# Patient Record
Sex: MALE | Race: WHITE | NOT HISPANIC OR LATINO | Employment: OTHER | URBAN - NONMETROPOLITAN AREA
[De-identification: names, ages, dates, MRNs, and addresses within clinical notes are randomized per-mention and may not be internally consistent; named-entity substitution may affect disease eponyms.]

---

## 2022-07-25 ENCOUNTER — TELEPHONE (OUTPATIENT)
Dept: FAMILY MEDICINE CLINIC | Facility: CLINIC | Age: 57
End: 2022-07-25

## 2022-07-25 NOTE — TELEPHONE ENCOUNTER
Patient states that [facility] sent over 2 requests for the patient for supplies for his CPAP machine. It needs the doctor's authorization. Patient is calling for an update. Ph: [phone]

## 2022-07-29 ENCOUNTER — TELEPHONE (OUTPATIENT)
Dept: FAMILY MEDICINE CLINIC | Facility: CLINIC | Age: 57
End: 2022-07-29

## 2022-07-29 DIAGNOSIS — G47.33 OBSTRUCTIVE SLEEP APNEA SYNDROME: Primary | ICD-10-CM

## 2022-08-01 ENCOUNTER — TELEPHONE (OUTPATIENT)
Dept: FAMILY MEDICINE CLINIC | Facility: CLINIC | Age: 57
End: 2022-08-01

## 2022-08-01 NOTE — TELEPHONE ENCOUNTER
PATIENT CALLED AND SAID HE NEEDS HIS SUPPLIES FOR HIS CPAP MACHINE SENT OVER TO [facility]. PATIENT CAN BE REACHED AT  [phone]

## 2022-08-17 RX ORDER — AMLODIPINE AND VALSARTAN 5; 320 MG/1; MG/1
TABLET ORAL
Qty: 90 TABLET | Refills: 0 | Status: SHIPPED | OUTPATIENT
Start: 2022-08-17 | End: 2022-11-30 | Stop reason: SDUPTHER

## 2022-08-17 RX ORDER — METOPROLOL SUCCINATE 50 MG/1
TABLET, EXTENDED RELEASE ORAL
Qty: 90 TABLET | Refills: 0 | Status: SHIPPED | OUTPATIENT
Start: 2022-08-17 | End: 2022-11-30 | Stop reason: SDUPTHER

## 2022-08-17 RX ORDER — EZETIMIBE 10 MG/1
TABLET ORAL
Qty: 90 TABLET | Refills: 0 | Status: SHIPPED | OUTPATIENT
Start: 2022-08-17 | End: 2022-11-30 | Stop reason: SDUPTHER

## 2022-08-17 RX ORDER — ROSUVASTATIN CALCIUM 10 MG/1
TABLET, COATED ORAL
Qty: 90 TABLET | Refills: 0 | Status: SHIPPED | OUTPATIENT
Start: 2022-08-17 | End: 2022-11-30 | Stop reason: SDUPTHER

## 2022-11-11 RX ORDER — METOPROLOL SUCCINATE 50 MG/1
TABLET, EXTENDED RELEASE ORAL
Qty: 90 TABLET | Refills: 0 | OUTPATIENT
Start: 2022-11-11

## 2022-11-11 RX ORDER — ROSUVASTATIN CALCIUM 10 MG/1
TABLET, COATED ORAL
Qty: 90 TABLET | Refills: 0 | OUTPATIENT
Start: 2022-11-11

## 2022-11-11 RX ORDER — EZETIMIBE 10 MG/1
TABLET ORAL
Qty: 90 TABLET | Refills: 0 | OUTPATIENT
Start: 2022-11-11

## 2022-11-11 RX ORDER — AMLODIPINE AND VALSARTAN 5; 320 MG/1; MG/1
TABLET ORAL
Qty: 90 TABLET | Refills: 0 | OUTPATIENT
Start: 2022-11-11

## 2022-11-12 RX ORDER — AMLODIPINE AND VALSARTAN 5; 320 MG/1; MG/1
TABLET ORAL
Qty: 90 TABLET | Refills: 0 | OUTPATIENT
Start: 2022-11-12

## 2022-11-15 RX ORDER — EZETIMIBE 10 MG/1
TABLET ORAL
Qty: 90 TABLET | Refills: 0 | OUTPATIENT
Start: 2022-11-15

## 2022-11-15 RX ORDER — AMLODIPINE AND VALSARTAN 5; 320 MG/1; MG/1
TABLET ORAL
Qty: 90 TABLET | Refills: 0 | OUTPATIENT
Start: 2022-11-15

## 2022-11-15 RX ORDER — METOPROLOL SUCCINATE 50 MG/1
TABLET, EXTENDED RELEASE ORAL
Qty: 90 TABLET | Refills: 0 | OUTPATIENT
Start: 2022-11-15

## 2022-11-15 RX ORDER — ROSUVASTATIN CALCIUM 10 MG/1
TABLET, COATED ORAL
Qty: 90 TABLET | Refills: 0 | OUTPATIENT
Start: 2022-11-15

## 2022-11-28 RX ORDER — METOPROLOL SUCCINATE 50 MG/1
TABLET, EXTENDED RELEASE ORAL
Qty: 90 TABLET | Refills: 0 | OUTPATIENT
Start: 2022-11-28

## 2022-11-28 RX ORDER — ROSUVASTATIN CALCIUM 10 MG/1
TABLET, COATED ORAL
Qty: 90 TABLET | Refills: 0 | OUTPATIENT
Start: 2022-11-28

## 2022-11-28 RX ORDER — AMLODIPINE AND VALSARTAN 5; 320 MG/1; MG/1
TABLET ORAL
Qty: 90 TABLET | Refills: 0 | OUTPATIENT
Start: 2022-11-28

## 2022-11-29 ENCOUNTER — TELEPHONE (OUTPATIENT)
Dept: FAMILY MEDICINE CLINIC | Facility: CLINIC | Age: 57
End: 2022-11-29

## 2022-11-29 NOTE — TELEPHONE ENCOUNTER
PT FOUND IN NEXT GEN WAS LAST SEEN 9 MONTHS AGO PT HAS BEEN SCHEDULED FOR AN APPT 12/16 FOR TELEPHONE VISIT THAT'S THE SOONEST APPT AVAILABLE HE RUNS OUT OF MEDICATION NEXT WEEK METOPROTOL 50 MG, AMLODIPINE 5-320 MG, ROSUVASTATIN CALCIUM 10 MG, EZETIMIBE 10 MG. PT IS REQUESTING REFILL TO GET THROUGH UNTIL HIS VISIT.  PLEASE ADVISE AND GIVE CALL

## 2022-11-30 RX ORDER — METOPROLOL SUCCINATE 50 MG/1
50 TABLET, EXTENDED RELEASE ORAL DAILY
Qty: 90 TABLET | Refills: 0 | Status: SHIPPED | OUTPATIENT
Start: 2022-11-30 | End: 2022-12-01 | Stop reason: SDUPTHER

## 2022-11-30 RX ORDER — EZETIMIBE 10 MG/1
10 TABLET ORAL DAILY
Qty: 90 TABLET | Refills: 0 | Status: SHIPPED | OUTPATIENT
Start: 2022-11-30 | End: 2022-12-01 | Stop reason: SDUPTHER

## 2022-11-30 RX ORDER — ROSUVASTATIN CALCIUM 10 MG/1
10 TABLET, COATED ORAL DAILY
Qty: 90 TABLET | Refills: 0 | Status: SHIPPED | OUTPATIENT
Start: 2022-11-30 | End: 2022-12-01 | Stop reason: SDUPTHER

## 2022-11-30 RX ORDER — AMLODIPINE AND VALSARTAN 5; 320 MG/1; MG/1
1 TABLET ORAL DAILY
Qty: 90 TABLET | Refills: 0 | Status: SHIPPED | OUTPATIENT
Start: 2022-11-30 | End: 2022-12-01 | Stop reason: SDUPTHER

## 2022-12-01 RX ORDER — METOPROLOL SUCCINATE 50 MG/1
50 TABLET, EXTENDED RELEASE ORAL DAILY
Qty: 90 TABLET | Refills: 0 | Status: SHIPPED | OUTPATIENT
Start: 2022-12-01 | End: 2022-12-16 | Stop reason: SDUPTHER

## 2022-12-01 RX ORDER — AMLODIPINE AND VALSARTAN 5; 320 MG/1; MG/1
1 TABLET ORAL DAILY
Qty: 90 TABLET | Refills: 0 | Status: SHIPPED | OUTPATIENT
Start: 2022-12-01 | End: 2022-12-16 | Stop reason: SDUPTHER

## 2022-12-01 RX ORDER — EZETIMIBE 10 MG/1
10 TABLET ORAL DAILY
Qty: 90 TABLET | Refills: 0 | Status: SHIPPED | OUTPATIENT
Start: 2022-12-01 | End: 2022-12-16 | Stop reason: SDUPTHER

## 2022-12-01 RX ORDER — ROSUVASTATIN CALCIUM 10 MG/1
10 TABLET, COATED ORAL DAILY
Qty: 90 TABLET | Refills: 0 | Status: SHIPPED | OUTPATIENT
Start: 2022-12-01 | End: 2022-12-16 | Stop reason: SDUPTHER

## 2022-12-16 ENCOUNTER — TELEMEDICINE (OUTPATIENT)
Dept: FAMILY MEDICINE CLINIC | Facility: CLINIC | Age: 57
End: 2022-12-16

## 2022-12-16 DIAGNOSIS — Z12.5 PROSTATE CANCER SCREENING: ICD-10-CM

## 2022-12-16 DIAGNOSIS — G47.33 OBSTRUCTIVE SLEEP APNEA SYNDROME: ICD-10-CM

## 2022-12-16 DIAGNOSIS — I10 PRIMARY HYPERTENSION: Primary | ICD-10-CM

## 2022-12-16 DIAGNOSIS — Z12.11 COLON CANCER SCREENING: ICD-10-CM

## 2022-12-16 DIAGNOSIS — E78.2 MIXED HYPERLIPIDEMIA: ICD-10-CM

## 2022-12-16 PROCEDURE — 99213 OFFICE O/P EST LOW 20 MIN: CPT | Performed by: FAMILY MEDICINE

## 2022-12-16 RX ORDER — METOPROLOL SUCCINATE 50 MG/1
50 TABLET, EXTENDED RELEASE ORAL DAILY
Qty: 90 TABLET | Refills: 0 | Status: SHIPPED | OUTPATIENT
Start: 2022-12-16

## 2022-12-16 RX ORDER — AMLODIPINE AND VALSARTAN 5; 320 MG/1; MG/1
1 TABLET ORAL DAILY
Qty: 90 TABLET | Refills: 0 | Status: SHIPPED | OUTPATIENT
Start: 2022-12-16

## 2022-12-16 RX ORDER — EZETIMIBE 10 MG/1
10 TABLET ORAL DAILY
Qty: 90 TABLET | Refills: 0 | Status: SHIPPED | OUTPATIENT
Start: 2022-12-16

## 2022-12-16 RX ORDER — ROSUVASTATIN CALCIUM 10 MG/1
10 TABLET, COATED ORAL DAILY
Qty: 90 TABLET | Refills: 0 | Status: SHIPPED | OUTPATIENT
Start: 2022-12-16 | End: 2023-03-17

## 2022-12-16 NOTE — PROGRESS NOTES
Chief Complaint  Hypertension, Hyperlipidemia, and Sleep apnea    Subjective          Nelson Fong presents to [facility]  History of Present Illness  Patient comes in today basically get his medications refilled he like to discuss possibly getting Cologuard he is concerned about COVID still would like to do this he wants to get his blood work set up and also basically wants to discuss his sleep apnea.  Patient consents that he wants his visit done through [facility] he is at home today and I am in the clinic  Hypertension  Pertinent negatives include no shortness of breath.   Hyperlipidemia  Pertinent negatives include no shortness of breath.       Objective   Vital Signs:   There were no vitals taken for this visit.    There is no height or weight on file to calculate BMI.    Review of Systems   Constitutional: Negative.    HENT: Negative for congestion, dental problem, ear discharge, ear pain and sore throat.    Respiratory: Negative for apnea, chest tightness and shortness of breath.    Gastrointestinal: Negative for constipation and nausea.   Endocrine: Negative for polyuria.   Genitourinary: Negative for difficulty urinating.   Musculoskeletal: Negative for arthralgias and gait problem.   Skin: Negative for rash.   Hematological: Negative for adenopathy.       Past History:  Medical History: has a past medical history of Knee pain and Sleep apnea (2006).   Surgical History: has no past surgical history on file.         Current Outpatient Medications:   •  amLODIPine-valsartan (EXFORGE) 5-320 MG per tablet, Take 1 tablet by mouth Daily., Disp: 90 tablet, Rfl: 0  •  ezetimibe (ZETIA) 10 MG tablet, Take 1 tablet by mouth Daily., Disp: 90 tablet, Rfl: 0  •  metoprolol succinate XL (TOPROL-XL) 50 MG 24 hr tablet, Take 1 tablet by mouth Daily., Disp: 90 tablet, Rfl: 0  •  rosuvastatin (CRESTOR) 10 MG tablet, Take 1 tablet by mouth Daily., Disp: 90 tablet, Rfl: 0    Allergies: Patient has  no allergy information on record.    Physical Exam  Vitals reviewed.   Constitutional:       Appearance: Normal appearance.   HENT:      Head: Normocephalic.      Right Ear: External ear normal.      Left Ear: External ear normal.      Nose: Nose normal.   Pulmonary:      Effort: Pulmonary effort is normal.   Neurological:      Mental Status: He is alert and oriented to person, place, and time. Mental status is at baseline.   Psychiatric:         Mood and Affect: Mood normal.          Result Review :                   Assessment and Plan    Diagnoses and all orders for this visit:    1. Primary hypertension (Primary)  Comments:  Patient reports BP is good refill medications arrange blood work  Orders:  -     amLODIPine-valsartan (EXFORGE) 5-320 MG per tablet; Take 1 tablet by mouth Daily.  Dispense: 90 tablet; Refill: 0  -     metoprolol succinate XL (TOPROL-XL) 50 MG 24 hr tablet; Take 1 tablet by mouth Daily.  Dispense: 90 tablet; Refill: 0  -     CBC & Differential; Future    2. Mixed hyperlipidemia  Comments:  Refill medications get blood work  Orders:  -     ezetimibe (ZETIA) 10 MG tablet; Take 1 tablet by mouth Daily.  Dispense: 90 tablet; Refill: 0  -     rosuvastatin (CRESTOR) 10 MG tablet; Take 1 tablet by mouth Daily.  Dispense: 90 tablet; Refill: 0  -     Comprehensive Metabolic Panel; Future  -     Lipid Panel; Future    3. Colon cancer screening  Comments:  We will arrange Cologuard  Orders:  -     Cologuard - Stool, Per Rectum; Future    4. Obstructive sleep apnea syndrome  Comments:  Discussed supplies and retesting limited CPAP is available we will monitor    5. Prostate cancer screening  Comments:  We will get PSA  Orders:  -     PSA Screen; Future              Follow Up   No follow-ups on file.  Patient was given instructions and counseling regarding his condition or for health maintenance advice. Please see specific information pulled into the AVS if appropriate.     Dave Giordano MD

## 2022-12-22 ENCOUNTER — LAB (OUTPATIENT)
Dept: FAMILY MEDICINE CLINIC | Facility: CLINIC | Age: 57
End: 2022-12-22

## 2022-12-22 DIAGNOSIS — Z12.5 PROSTATE CANCER SCREENING: ICD-10-CM

## 2022-12-22 DIAGNOSIS — I10 PRIMARY HYPERTENSION: ICD-10-CM

## 2022-12-22 DIAGNOSIS — E78.2 MIXED HYPERLIPIDEMIA: ICD-10-CM

## 2022-12-22 PROCEDURE — 36415 COLL VENOUS BLD VENIPUNCTURE: CPT | Performed by: FAMILY MEDICINE

## 2022-12-23 LAB
ALBUMIN SERPL-MCNC: 4.6 G/DL (ref 3.8–4.9)
ALBUMIN/GLOB SERPL: 2 {RATIO} (ref 1.2–2.2)
ALP SERPL-CCNC: 98 IU/L (ref 44–121)
ALT SERPL-CCNC: 60 IU/L (ref 0–44)
AST SERPL-CCNC: 35 IU/L (ref 0–40)
BASOPHILS # BLD AUTO: 0 X10E3/UL (ref 0–0.2)
BASOPHILS NFR BLD AUTO: 1 %
BILIRUB SERPL-MCNC: 0.5 MG/DL (ref 0–1.2)
BUN SERPL-MCNC: 15 MG/DL (ref 6–24)
BUN/CREAT SERPL: 18 (ref 9–20)
CALCIUM SERPL-MCNC: 9.6 MG/DL (ref 8.7–10.2)
CHLORIDE SERPL-SCNC: 101 MMOL/L (ref 96–106)
CHOLEST SERPL-MCNC: 173 MG/DL (ref 100–199)
CO2 SERPL-SCNC: 24 MMOL/L (ref 20–29)
CREAT SERPL-MCNC: 0.84 MG/DL (ref 0.76–1.27)
EGFRCR SERPLBLD CKD-EPI 2021: 102 ML/MIN/1.73
EOSINOPHIL # BLD AUTO: 0.2 X10E3/UL (ref 0–0.4)
EOSINOPHIL NFR BLD AUTO: 3 %
ERYTHROCYTE [DISTWIDTH] IN BLOOD BY AUTOMATED COUNT: 13.3 % (ref 11.6–15.4)
GLOBULIN SER CALC-MCNC: 2.3 G/DL (ref 1.5–4.5)
GLUCOSE SERPL-MCNC: 121 MG/DL (ref 70–99)
HCT VFR BLD AUTO: 46.3 % (ref 37.5–51)
HDLC SERPL-MCNC: 50 MG/DL
HGB BLD-MCNC: 15.4 G/DL (ref 13–17.7)
IMM GRANULOCYTES # BLD AUTO: 0 X10E3/UL (ref 0–0.1)
IMM GRANULOCYTES NFR BLD AUTO: 0 %
LDLC SERPL CALC-MCNC: 76 MG/DL (ref 0–99)
LYMPHOCYTES # BLD AUTO: 2.7 X10E3/UL (ref 0.7–3.1)
LYMPHOCYTES NFR BLD AUTO: 33 %
MCH RBC QN AUTO: 32 PG (ref 26.6–33)
MCHC RBC AUTO-ENTMCNC: 33.3 G/DL (ref 31.5–35.7)
MCV RBC AUTO: 96 FL (ref 79–97)
MONOCYTES # BLD AUTO: 1 X10E3/UL (ref 0.1–0.9)
MONOCYTES NFR BLD AUTO: 12 %
NEUTROPHILS # BLD AUTO: 4.2 X10E3/UL (ref 1.4–7)
NEUTROPHILS NFR BLD AUTO: 51 %
PLATELET # BLD AUTO: 205 X10E3/UL (ref 150–450)
POTASSIUM SERPL-SCNC: 4.3 MMOL/L (ref 3.5–5.2)
PROT SERPL-MCNC: 6.9 G/DL (ref 6–8.5)
PSA SERPL-MCNC: 0.9 NG/ML (ref 0–4)
RBC # BLD AUTO: 4.82 X10E6/UL (ref 4.14–5.8)
SODIUM SERPL-SCNC: 140 MMOL/L (ref 134–144)
TRIGL SERPL-MCNC: 295 MG/DL (ref 0–149)
VLDLC SERPL CALC-MCNC: 47 MG/DL (ref 5–40)
WBC # BLD AUTO: 8.3 X10E3/UL (ref 3.4–10.8)

## 2022-12-28 ENCOUNTER — TELEPHONE (OUTPATIENT)
Dept: FAMILY MEDICINE CLINIC | Facility: CLINIC | Age: 57
End: 2022-12-28

## 2022-12-28 NOTE — PROGRESS NOTES
One of his liver test was up and his sugar was slightly up so just needs to watch the fats and sugar in his diet because his triglycerides were up to not repeat those in 6 months

## 2022-12-28 NOTE — TELEPHONE ENCOUNTER
Hub can read voice mail box not set up   Will send letter to call the office ----- Message from Dave Giordano MD sent at 12/28/2022  8:08 AM EST -----  One of his liver test was up and his sugar was slightly up so just needs to watch the fats and sugar in his diet because his triglycerides were up to not repeat those in 6 months

## 2023-01-11 ENCOUNTER — TELEPHONE (OUTPATIENT)
Dept: FAMILY MEDICINE CLINIC | Facility: CLINIC | Age: 58
End: 2023-01-11
Payer: COMMERCIAL

## 2023-01-11 DIAGNOSIS — Z12.11 COLON CANCER SCREENING: Primary | ICD-10-CM

## 2023-01-11 NOTE — TELEPHONE ENCOUNTER
----- Message from Dave Giordano MD sent at 1/11/2023  2:12 PM EST -----  Cologuard was positive he needs to be scheduled for colonoscopy      Patient contacted

## 2023-01-11 NOTE — TELEPHONE ENCOUNTER
----- Message from Dave Giordano MD sent at 1/11/2023  2:12 PM EST -----  Cologuard was positive  he needs to be scheduled for colonoscopy            Patient contacted           He needs to be scheduled for colonoscopy

## 2023-02-16 ENCOUNTER — TELEPHONE (OUTPATIENT)
Dept: FAMILY MEDICINE CLINIC | Facility: CLINIC | Age: 58
End: 2023-02-16

## 2023-02-16 NOTE — TELEPHONE ENCOUNTER
Pharmacy Name: [facility] [address] [place] [address] [facility] [phone] [facility] [phone]      Pharmacy representative name: STEPHANI    Pharmacy representative phone number: [phone]    What medication are you calling in regards to: ROSUVASTATIN 10 MG    What question does the pharmacy have: THE ABOVE MEDICATION IS ON BACK ORDER AND INSURANCE WILL NOT COVER 5 MG TWICE A DAY.  IS THERE ANOTHER STATIN THAT DR WISE WOULD LIKE TO SEND IN?    Who is the provider that prescribed the medication: BILLIE

## 2023-03-17 DIAGNOSIS — E78.2 MIXED HYPERLIPIDEMIA: ICD-10-CM

## 2023-03-17 RX ORDER — ROSUVASTATIN CALCIUM 10 MG/1
TABLET, COATED ORAL
Qty: 90 TABLET | Refills: 0 | Status: SHIPPED | OUTPATIENT
Start: 2023-03-17

## 2023-05-22 DIAGNOSIS — I10 PRIMARY HYPERTENSION: ICD-10-CM

## 2023-05-22 RX ORDER — METOPROLOL SUCCINATE 50 MG/1
TABLET, EXTENDED RELEASE ORAL
Qty: 90 TABLET | Refills: 0 | OUTPATIENT
Start: 2023-05-22

## 2023-05-22 RX ORDER — AMLODIPINE AND VALSARTAN 5; 320 MG/1; MG/1
TABLET ORAL
Qty: 90 TABLET | Refills: 0 | OUTPATIENT
Start: 2023-05-22

## 2023-05-22 NOTE — TELEPHONE ENCOUNTER
BILLIE PATIENT  HUB CAN READ & SCHEDULE  Called patient but was disconnected, called back and left message to call the office to schedule appt for med refills.

## 2023-06-06 ENCOUNTER — TELEMEDICINE (OUTPATIENT)
Dept: FAMILY MEDICINE CLINIC | Facility: CLINIC | Age: 58
End: 2023-06-06
Payer: COMMERCIAL

## 2023-06-06 VITALS — WEIGHT: 275 LBS | HEIGHT: 70 IN | BODY MASS INDEX: 39.37 KG/M2

## 2023-06-06 DIAGNOSIS — I10 PRIMARY HYPERTENSION: ICD-10-CM

## 2023-06-06 DIAGNOSIS — E78.2 MIXED HYPERLIPIDEMIA: ICD-10-CM

## 2023-06-06 RX ORDER — ROSUVASTATIN CALCIUM 10 MG/1
10 TABLET, COATED ORAL DAILY
Qty: 90 TABLET | Refills: 1 | Status: SHIPPED | OUTPATIENT
Start: 2023-06-06

## 2023-06-06 RX ORDER — METOPROLOL SUCCINATE 50 MG/1
50 TABLET, EXTENDED RELEASE ORAL DAILY
Qty: 90 TABLET | Refills: 1 | Status: SHIPPED | OUTPATIENT
Start: 2023-06-06

## 2023-06-06 RX ORDER — EZETIMIBE 10 MG/1
10 TABLET ORAL DAILY
Qty: 90 TABLET | Refills: 1 | Status: SHIPPED | OUTPATIENT
Start: 2023-06-06

## 2023-06-06 RX ORDER — AMLODIPINE AND VALSARTAN 5; 320 MG/1; MG/1
1 TABLET ORAL DAILY
Qty: 90 TABLET | Refills: 1 | Status: SHIPPED | OUTPATIENT
Start: 2023-06-06

## 2023-06-06 NOTE — PROGRESS NOTES
"Chief Complaint  Hypertension and Hyperlipidemia    Subjective          Nelson Fong presents to [facility]  History of Present Illness  He comes in today recheck on his medical problems he needs his blood pressure treatment medications refilled and his cholesterol he says he is doing well with no problems or difficulties he states that he has been feeling well and states that he just needs his meds redone.  Patient consents to have his visit done through [facility] today he is at home and I am in the clinic  Hypertension  Pertinent negatives include no shortness of breath.   Hyperlipidemia  Pertinent negatives include no shortness of breath.     Objective   Vital Signs:   Ht 177.8 cm (70\")   Wt 125 kg (275 lb)   BMI 39.46 kg/m²     Body mass index is 39.46 kg/m².    Review of Systems   Constitutional: Negative.    HENT:  Negative for congestion, dental problem, ear discharge, ear pain and sore throat.    Respiratory:  Negative for apnea, chest tightness and shortness of breath.    Gastrointestinal:  Negative for constipation and nausea.   Endocrine: Negative for polyuria.   Genitourinary:  Negative for difficulty urinating.   Musculoskeletal:  Negative for arthralgias and gait problem.   Skin:  Negative for rash.   Hematological:  Negative for adenopathy.     Past History:  Medical History: has a past medical history of Knee pain and Sleep apnea (2006).   Surgical History: has no past surgical history on file.         Current Outpatient Medications:     amLODIPine-valsartan (EXFORGE) 5-320 MG per tablet, Take 1 tablet by mouth Daily., Disp: 90 tablet, Rfl: 1    ezetimibe (ZETIA) 10 MG tablet, Take 1 tablet by mouth Daily., Disp: 90 tablet, Rfl: 1    metoprolol succinate XL (TOPROL-XL) 50 MG 24 hr tablet, Take 1 tablet by mouth Daily., Disp: 90 tablet, Rfl: 1    rosuvastatin (CRESTOR) 10 MG tablet, Take 1 tablet by mouth Daily., Disp: 90 tablet, Rfl: 1    Allergies: Patient has no known " allergies.    Physical Exam  Constitutional:       Appearance: Normal appearance.   HENT:      Head: Normocephalic.      Right Ear: External ear normal.      Left Ear: External ear normal.      Nose: Nose normal.      Mouth/Throat:      Pharynx: Oropharynx is clear.   Eyes:      Pupils: Pupils are equal, round, and reactive to light.   Neurological:      General: No focal deficit present.      Mental Status: He is alert and oriented to person, place, and time.        Result Review :                   Assessment and Plan    Diagnoses and all orders for this visit:    1. Mixed hyperlipidemia  Comments:  Refill medications get blood work  Orders:  -     rosuvastatin (CRESTOR) 10 MG tablet; Take 1 tablet by mouth Daily.  Dispense: 90 tablet; Refill: 1  -     ezetimibe (ZETIA) 10 MG tablet; Take 1 tablet by mouth Daily.  Dispense: 90 tablet; Refill: 1    2. Primary hypertension  Comments:  Patient reports BP is good refill medications arrange blood work  Orders:  -     metoprolol succinate XL (TOPROL-XL) 50 MG 24 hr tablet; Take 1 tablet by mouth Daily.  Dispense: 90 tablet; Refill: 1  -     amLODIPine-valsartan (EXFORGE) 5-320 MG per tablet; Take 1 tablet by mouth Daily.  Dispense: 90 tablet; Refill: 1              Follow Up   Return in about 6 months (around 12/6/2023).  Patient was given instructions and counseling regarding his condition or for health maintenance advice. Please see specific information pulled into the AVS if appropriate.     Dave Giordano MD

## 2023-12-06 DIAGNOSIS — I10 PRIMARY HYPERTENSION: ICD-10-CM

## 2023-12-06 DIAGNOSIS — E78.2 MIXED HYPERLIPIDEMIA: ICD-10-CM

## 2023-12-06 NOTE — TELEPHONE ENCOUNTER
Caller: Nelson Fong    Relationship: Self    Best call back number: [phone]     Requested Prescriptions:   Requested Prescriptions     Pending Prescriptions Disp Refills    amLODIPine-valsartan (EXFORGE) 5-320 MG per tablet 90 tablet 1     Sig: Take 1 tablet by mouth Daily.    ezetimibe (ZETIA) 10 MG tablet 90 tablet 1     Sig: Take 1 tablet by mouth Daily.    metoprolol succinate XL (TOPROL-XL) 50 MG 24 hr tablet 90 tablet 1     Sig: Take 1 tablet by mouth Daily.    rosuvastatin (CRESTOR) 10 MG tablet 90 tablet 1     Sig: Take 1 tablet by mouth Daily.        Pharmacy where request should be sent: [address] [phone] [facility] [phone] FX     Last office visit with prescribing clinician: Visit date not found   Last telemedicine visit with prescribing clinician: Visit date not found   Next office visit with prescribing clinician: Visit date not found     Does the patient have less than a 3 day supply:  [] Yes  [x] No    Would you like a call back once the refill request has been completed: [x] Yes [] No    If the office needs to give you a call back, can they leave a voicemail: [x] Yes [] No    Leah Mata Rep   12/06/23 09:36 EST

## 2023-12-08 NOTE — TELEPHONE ENCOUNTER
Caller: Nelson Fong    Relationship to patient: Self    Best call back number: [phone]     Patient is needing: PATIENT IS CHECKING UP ON THE STATUS OF THE PRESCRIPTION REFILLS.

## 2023-12-11 RX ORDER — EZETIMIBE 10 MG/1
10 TABLET ORAL DAILY
Qty: 90 TABLET | Refills: 0 | Status: SHIPPED | OUTPATIENT
Start: 2023-12-11

## 2023-12-11 RX ORDER — ROSUVASTATIN CALCIUM 10 MG/1
10 TABLET, COATED ORAL DAILY
Qty: 90 TABLET | Refills: 0 | Status: SHIPPED | OUTPATIENT
Start: 2023-12-11

## 2023-12-11 RX ORDER — METOPROLOL SUCCINATE 50 MG/1
50 TABLET, EXTENDED RELEASE ORAL DAILY
Qty: 90 TABLET | Refills: 0 | Status: SHIPPED | OUTPATIENT
Start: 2023-12-11

## 2023-12-11 RX ORDER — AMLODIPINE AND VALSARTAN 5; 320 MG/1; MG/1
1 TABLET ORAL DAILY
Qty: 90 TABLET | Refills: 0 | Status: SHIPPED | OUTPATIENT
Start: 2023-12-11

## 2023-12-22 ENCOUNTER — TELEPHONE (OUTPATIENT)
Dept: FAMILY MEDICINE CLINIC | Facility: CLINIC | Age: 58
End: 2023-12-22
Payer: COMMERCIAL

## 2023-12-22 NOTE — TELEPHONE ENCOUNTER
Caller: FongNelson    Relationship: Self    Best call back number: [phone]     Requested Prescriptions:   CPAP SUPPLIES        Pharmacy where request should be sent: [address] [facility] [phone] [facility] [phone] FX     Last office visit with prescribing clinician: Visit date not found   Last telemedicine visit with prescribing clinician: Visit date not found   Next office visit with prescribing clinician: 3/25/2024     Additional details provided by patient: OFFBOARD FROM DR WISE AND HAS APPOINTMENT WITH DR CERON. NEEDS CPAP SUPPLIES     Does the patient have less than a 3 day supply:  [] Yes  [x] No    Would you like a call back once the refill request has been completed: [] Yes [x] No    If the office needs to give you a call back, can they leave a voicemail: [] Yes [x] No    Leah Choudhary Rep   12/22/23 10:26 EST

## 2023-12-27 DIAGNOSIS — G47.33 OBSTRUCTIVE SLEEP APNEA SYNDROME: Primary | ICD-10-CM

## 2023-12-27 NOTE — TELEPHONE ENCOUNTER
"PATIENT STILL HAS NOT HEARD BACK FROM ANYONE ABOUT THIS. NEEDS RX SENT TO [facility] STATING \"CPAP SUPPLIES AS NEEDED\"    PLEASE CALL PATIENT WHEN THIS IS SENT.   "

## 2023-12-28 NOTE — TELEPHONE ENCOUNTER
PATIENT STATES [facility] DOES NOT HAVE THE ORDER FOR THE CPAP SUPPLIES AS OF A FEW MINUTES AGO. HE PROVIDED THE FOLLOWING FAX NUMBER: [phone]

## 2024-03-05 DIAGNOSIS — E78.2 MIXED HYPERLIPIDEMIA: ICD-10-CM

## 2024-03-05 DIAGNOSIS — I10 PRIMARY HYPERTENSION: ICD-10-CM

## 2024-03-05 RX ORDER — ROSUVASTATIN CALCIUM 10 MG/1
10 TABLET, COATED ORAL DAILY
Qty: 90 TABLET | Refills: 0 | Status: CANCELLED | OUTPATIENT
Start: 2024-03-05

## 2024-03-05 RX ORDER — METOPROLOL SUCCINATE 50 MG/1
50 TABLET, EXTENDED RELEASE ORAL DAILY
Qty: 90 TABLET | Refills: 0 | Status: CANCELLED | OUTPATIENT
Start: 2024-03-05

## 2024-03-05 RX ORDER — AMLODIPINE AND VALSARTAN 5; 320 MG/1; MG/1
1 TABLET ORAL DAILY
Qty: 90 TABLET | Refills: 0 | Status: CANCELLED | OUTPATIENT
Start: 2024-03-05

## 2024-03-05 RX ORDER — EZETIMIBE 10 MG/1
10 TABLET ORAL DAILY
Qty: 90 TABLET | Refills: 0 | Status: CANCELLED | OUTPATIENT
Start: 2024-03-05

## 2024-03-05 NOTE — TELEPHONE ENCOUNTER
Caller: Nelson Fong    Relationship: Self    Best call back number: [phone]     Requested Prescriptions:   Requested Prescriptions     Pending Prescriptions Disp Refills    amLODIPine-valsartan (EXFORGE) 5-320 MG per tablet 90 tablet 0     Sig: Take 1 tablet by mouth Daily.    metoprolol succinate XL (TOPROL-XL) 50 MG 24 hr tablet 90 tablet 0     Sig: Take 1 tablet by mouth Daily.    rosuvastatin (CRESTOR) 10 MG tablet 90 tablet 0     Sig: Take 1 tablet by mouth Daily.    ezetimibe (ZETIA) 10 MG tablet 90 tablet 0     Sig: Take 1 tablet by mouth Daily.        Pharmacy where request should be sent: [address] [phone] [facility] [phone]      Last office visit with prescribing clinician: Visit date not found   Last telemedicine visit with prescribing clinician: Visit date not found   Next office visit with prescribing clinician: Visit date not found     Additional details provided by patient: PATIENT HAS CALLED REQUESTING REFILLS ON ABOVE MEDICATIONS.    Does the patient have less than a 3 day supply:  [] Yes  [x] No    Would you like a call back once the refill request has been completed: [] Yes [x] No    If the office needs to give you a call back, can they leave a voicemail: [] Yes [x] No    Ivis Nails   03/05/24 10:16 EST

## 2024-03-07 RX ORDER — AMLODIPINE AND VALSARTAN 5; 320 MG/1; MG/1
1 TABLET ORAL DAILY
Qty: 90 TABLET | Refills: 0 | Status: SHIPPED | OUTPATIENT
Start: 2024-03-07

## 2024-03-07 RX ORDER — METOPROLOL SUCCINATE 50 MG/1
50 TABLET, EXTENDED RELEASE ORAL DAILY
Qty: 90 TABLET | Refills: 0 | Status: SHIPPED | OUTPATIENT
Start: 2024-03-07

## 2024-03-07 RX ORDER — EZETIMIBE 10 MG/1
10 TABLET ORAL DAILY
Qty: 90 TABLET | Refills: 0 | Status: SHIPPED | OUTPATIENT
Start: 2024-03-07

## 2024-03-07 RX ORDER — ROSUVASTATIN CALCIUM 10 MG/1
10 TABLET, COATED ORAL DAILY
Qty: 90 TABLET | Refills: 0 | Status: SHIPPED | OUTPATIENT
Start: 2024-03-07

## 2024-04-19 ENCOUNTER — OFFICE VISIT (OUTPATIENT)
Dept: FAMILY MEDICINE CLINIC | Facility: CLINIC | Age: 59
End: 2024-04-19
Payer: COMMERCIAL

## 2024-04-19 VITALS
BODY MASS INDEX: 40.59 KG/M2 | DIASTOLIC BLOOD PRESSURE: 88 MMHG | OXYGEN SATURATION: 99 % | HEART RATE: 62 BPM | WEIGHT: 283.5 LBS | SYSTOLIC BLOOD PRESSURE: 142 MMHG | HEIGHT: 70 IN

## 2024-04-19 DIAGNOSIS — E78.2 MIXED HYPERLIPIDEMIA: ICD-10-CM

## 2024-04-19 DIAGNOSIS — Z12.5 PROSTATE CANCER SCREENING: ICD-10-CM

## 2024-04-19 DIAGNOSIS — I10 PRIMARY HYPERTENSION: ICD-10-CM

## 2024-04-19 DIAGNOSIS — Z00.00 ANNUAL PHYSICAL EXAM: Primary | ICD-10-CM

## 2024-04-19 NOTE — PROGRESS NOTES
Male Physical Note      Date: 2024   Patient Name: Nelson Fong  : 1965   MRN: [id number]     Chief Complaint:    Chief Complaint   Patient presents with    [facility]    Annual Exam     Patient fasting for labs        History of Present Illness: Nelson Fong is a 59 y.o. male who is here today for their annual health maintenance and physical.  Patient is requesting blood work today.  No new complaints.      Subjective      Review of Systems:   Review of Systems   Constitutional:  Negative for fatigue and fever.   HENT:  Negative for congestion.    Respiratory:  Negative for cough and shortness of breath.    Cardiovascular:  Negative for chest pain.       Past Medical History, Social History, Family History and Care Team were all reviewed with patient and updated as appropriate.     Medications:     Current Outpatient Medications:     amLODIPine-valsartan (EXFORGE) 5-320 MG per tablet, Take 1 tablet by mouth once daily, Disp: 90 tablet, Rfl: 0    ezetimibe (ZETIA) 10 MG tablet, Take 1 tablet by mouth once daily, Disp: 90 tablet, Rfl: 0    metoprolol succinate XL (TOPROL-XL) 50 MG 24 hr tablet, Take 1 tablet by mouth once daily, Disp: 90 tablet, Rfl: 0    rosuvastatin (CRESTOR) 10 MG tablet, Take 1 tablet by mouth once daily, Disp: 90 tablet, Rfl: 0    Allergies:   No Known Allergies    Immunizations:  Health Maintenance Summary            Overdue - ANNUAL PHYSICAL (Yearly) Never done      No completion, postpone, or frequency change history exists for this topic.              Ordered - LIPID PANEL (Yearly) Ordered on 2022  Lipid Panel              Postponed - HEPATITIS C SCREENING (Once) Postponed until 2024  Postponed until 2025 by Chucky Luna MD (Patient Refused)              Postponed - TDAP/TD VACCINES (1 - Tdap) Postponed until 2024  Postponed until 2025 by Chucky Luna MD (Pending event)               Postponed - ZOSTER VACCINE (1 of 2) Postponed until 4/19/2025 04/19/2024  Postponed until 4/19/2025 by Chucky Luna MD (Product Unavailable)              INFLUENZA VACCINE (Yearly - August to March) Next due on 8/1/2024      10/09/2023  Imm Admin: Fluzone (or Fluarix & Flulaval for VFC) >6mos    10/19/2022  Imm Admin: Fluzone (or Fluarix & Flulaval for VFC) >6mos    11/12/2021  Imm Admin: Fluzone (or Fluarix & Flulaval for VFC) >6mos    10/08/2019  Imm Admin: Fluzone (or Fluarix & Flulaval for VFC) >6mos    11/16/2018  Imm Admin: Fluzone (or Fluarix & Flulaval for VFC) >6mos    Only the first 5 history entries have been loaded, but more history exists.              BMI FOLLOWUP (Yearly) Next due on 4/19/2025 04/19/2024  [employer]: WORKFLOW - QUALITY MEASUREMENT - DOCUMENTED WEIGHT FOLLOW-UP PLAN              COLORECTAL CANCER SCREENING (COLOGUARD - Every 3 Years) Next due on 1/6/2026 01/06/2023  Cologuard component of Cologuard - Stool, Per Rectum              COVID-19 Vaccine (Series Information) Completed      10/09/2023  Imm Admin: COVID-19 F23 (MODERNA) 12YRS+ (SPIKEVAX)    05/16/2023  Imm Admin: COVID-19 (MODERNA) BIVALENT 12+YRS    10/04/2022  Imm Admin: COVID-19 (PFIZER) BIVALENT 12+YRS    05/18/2022  Imm Admin: Covid-19 (Pfizer) Gray Cap Monovalent    09/29/2021  Imm Admin: COVID-19 (PFIZER) Purple Cap Monovalent    Only the first 5 history entries have been loaded, but more history exists.              Pneumococcal Vaccine 0-64 (Series Information) Aged Out      No completion, postpone, or frequency change history exists for this topic.                    No orders of the defined types were placed in this encounter.      Colorectal Screening:   Colonoscopy completed on 2/7/2023.  His regimen is to be every 5 years.  Last Completed Colonoscopy            COLORECTAL CANCER SCREENING (COLOGUARD - Every 3 Years) Next due on 1/6/2026 01/06/2023  Cologuard component of Cologuard  "- Stool, Per Rectum                  Hep C (Age 18-79 once): Declined testing  PSA (Over age 50, C Level Recommendation): Pending    The 10-year ASCVD risk score (Franklyn HAMMER, et al., 2019) is: 9.6%    Values used to calculate the score:      Age: 59 years      Sex: Male      Is Non- : No      Diabetic: No      Tobacco smoker: No      Systolic Blood Pressure: 142 mmHg      Is BP treated: Yes      HDL Cholesterol: 50 mg/dL      Total Cholesterol: 173 mg/dL      Tobacco Use: Medium Risk (4/19/2024)    Patient History     Smoking Tobacco Use: Former     Smokeless Tobacco Use: Never     Passive Exposure: Not on file       Social History     Substance and Sexual Activity   Alcohol Use Yes    Alcohol/week: 4.0 standard drinks of alcohol    Types: 4 Drinks containing 0.5 oz of alcohol per week        Social History     Substance and Sexual Activity   Drug Use Never        PHQ-2 Depression Screening  PHQ-9 Total Score: 0      Measures:   Advanced Care Planning:   AVS education placed on chart       Objective     Physical Exam:  Vital Signs:   Vitals:    04/19/24 1258   BP: 142/88   BP Location: Left arm   Patient Position: Sitting   Cuff Size: Large Adult   Pulse: 62   SpO2: 99%   Weight: 129 kg (283 lb 8 oz)   Height: 177.8 cm (70\")     Facility age limit for growth %amos is 20 years.  Body mass index is 40.68 kg/m².     Physical Exam  Vitals and nursing note reviewed.   Constitutional:       Appearance: Normal appearance. He is obese. He is not ill-appearing or toxic-appearing.   HENT:      Head: Normocephalic and atraumatic.      Right Ear: Tympanic membrane normal.      Left Ear: Tympanic membrane normal.      Mouth/Throat:      Mouth: Mucous membranes are moist.      Pharynx: Oropharynx is clear.   Cardiovascular:      Rate and Rhythm: Normal rate and regular rhythm.   Pulmonary:      Effort: Pulmonary effort is normal.      Breath sounds: Normal breath sounds.   Neurological:      Mental Status: " He is alert.          POCT Results (if applicable):   Results for orders placed or performed in visit on 12/22/22   Comprehensive Metabolic Panel    Specimen: Arm, Left; Blood   Result Value Ref Range    Glucose 121 (H) 70 - 99 mg/dL    BUN 15 6 - 24 mg/dL    Creatinine 0.84 0.76 - 1.27 mg/dL    EGFR Result 102 >59 mL/min/1.73    BUN/Creatinine Ratio 18 9 - 20    Sodium 140 134 - 144 mmol/L    Potassium 4.3 3.5 - 5.2 mmol/L    Chloride 101 96 - 106 mmol/L    Total CO2 24 20 - 29 mmol/L    Calcium 9.6 8.7 - 10.2 mg/dL    Total Protein 6.9 6.0 - 8.5 g/dL    Albumin 4.6 3.8 - 4.9 g/dL    Globulin 2.3 1.5 - 4.5 g/dL    A/G Ratio 2.0 1.2 - 2.2    Total Bilirubin 0.5 0.0 - 1.2 mg/dL    Alkaline Phosphatase 98 44 - 121 IU/L    AST (SGOT) 35 0 - 40 IU/L    ALT (SGPT) 60 (H) 0 - 44 IU/L   Lipid Panel    Specimen: Arm, Left; Blood   Result Value Ref Range    Total Cholesterol 173 100 - 199 mg/dL    Triglycerides 295 (H) 0 - 149 mg/dL    HDL Cholesterol 50 >39 mg/dL    VLDL Cholesterol Prabhjot 47 (H) 5 - 40 mg/dL    LDL Chol Calc (NIH) 76 0 - 99 mg/dL   PSA Screen    Specimen: Arm, Left; Blood   Result Value Ref Range    PSA 0.9 0.0 - 4.0 ng/mL   CBC Auto Differential    Specimen: Arm, Left; Blood   Result Value Ref Range    WBC 8.3 3.4 - 10.8 x10E3/uL    RBC 4.82 4.14 - 5.80 x10E6/uL    Hemoglobin 15.4 13.0 - 17.7 g/dL    Hematocrit 46.3 37.5 - 51.0 %    MCV 96 79 - 97 fL    MCH 32.0 26.6 - 33.0 pg    MCHC 33.3 31.5 - 35.7 g/dL    RDW 13.3 11.6 - 15.4 %    Platelets 205 150 - 450 x10E3/uL    Neutrophil Rel % 51 Not Estab. %    Lymphocyte Rel % 33 Not Estab. %    Monocyte Rel % 12 Not Estab. %    Eosinophil Rel % 3 Not Estab. %    Basophil Rel % 1 Not Estab. %    Neutrophils Absolute 4.2 1.4 - 7.0 x10E3/uL    Lymphocytes Absolute 2.7 0.7 - 3.1 x10E3/uL    Monocytes Absolute 1.0 (H) 0.1 - 0.9 x10E3/uL    Eosinophils Absolute 0.2 0.0 - 0.4 x10E3/uL    Basophils Absolute 0.0 0.0 - 0.2 x10E3/uL    Immature Granulocyte Rel % 0 Not  Estab. %    Immature Grans Absolute 0.0 0.0 - 0.1 x10E3/uL       Procedures    Assessment / Plan      Assessment/Plan:   Diagnoses and all orders for this visit:    1. Annual physical exam (Primary)    2. Primary hypertension  -     CBC Auto Differential; Future  -     Comprehensive Metabolic Panel; Future  -     CBC Auto Differential  -     Comprehensive Metabolic Panel    3. Mixed hyperlipidemia  -     Lipid Panel; Future  -     Lipid Panel    4. Prostate cancer screening  -     PSA Screen; Future  -     PSA Screen       Patient declines hepatitis C screening today.  He states he will get his updated vaccines through his pharmacy.    Healthcare Maintenance:  Counseling provided based on age appropriate USPSTF guidelines.       Nelson Fong voices understanding and acceptance of this advice and will call back with any further questions or concerns. AVS with preventive healthcare tips printed for patient.     Vaccine Counseling:      Follow Up:   Return in about 6 months (around 10/19/2024).      Tonia Luna MD  [facility]AC[facility]ares

## 2024-04-20 LAB
ALBUMIN SERPL-MCNC: 5.1 G/DL (ref 3.8–4.9)
ALBUMIN/GLOB SERPL: 2 {RATIO} (ref 1.2–2.2)
ALP SERPL-CCNC: 110 IU/L (ref 44–121)
ALT SERPL-CCNC: 37 IU/L (ref 0–44)
AST SERPL-CCNC: 34 IU/L (ref 0–40)
BASOPHILS # BLD AUTO: 0.1 X10E3/UL (ref 0–0.2)
BASOPHILS NFR BLD AUTO: 1 %
BILIRUB SERPL-MCNC: 0.7 MG/DL (ref 0–1.2)
BUN SERPL-MCNC: 14 MG/DL (ref 6–24)
BUN/CREAT SERPL: 16 (ref 9–20)
CALCIUM SERPL-MCNC: 10.3 MG/DL (ref 8.7–10.2)
CHLORIDE SERPL-SCNC: 103 MMOL/L (ref 96–106)
CHOLEST SERPL-MCNC: 152 MG/DL (ref 100–199)
CO2 SERPL-SCNC: 18 MMOL/L (ref 20–29)
CREAT SERPL-MCNC: 0.9 MG/DL (ref 0.76–1.27)
EGFRCR SERPLBLD CKD-EPI 2021: 98 ML/MIN/1.73
EOSINOPHIL # BLD AUTO: 0.2 X10E3/UL (ref 0–0.4)
EOSINOPHIL NFR BLD AUTO: 2 %
ERYTHROCYTE [DISTWIDTH] IN BLOOD BY AUTOMATED COUNT: 13.5 % (ref 11.6–15.4)
GLOBULIN SER CALC-MCNC: 2.5 G/DL (ref 1.5–4.5)
GLUCOSE SERPL-MCNC: ABNORMAL MG/DL
HCT VFR BLD AUTO: 45.1 % (ref 37.5–51)
HDLC SERPL-MCNC: 49 MG/DL
HGB BLD-MCNC: 15 G/DL (ref 13–17.7)
IMM GRANULOCYTES # BLD AUTO: 0 X10E3/UL (ref 0–0.1)
IMM GRANULOCYTES NFR BLD AUTO: 0 %
LDLC SERPL CALC-MCNC: 68 MG/DL (ref 0–99)
LYMPHOCYTES # BLD AUTO: 2.6 X10E3/UL (ref 0.7–3.1)
LYMPHOCYTES NFR BLD AUTO: 33 %
MCH RBC QN AUTO: 31 PG (ref 26.6–33)
MCHC RBC AUTO-ENTMCNC: 33.3 G/DL (ref 31.5–35.7)
MCV RBC AUTO: 93 FL (ref 79–97)
MONOCYTES # BLD AUTO: 0.9 X10E3/UL (ref 0.1–0.9)
MONOCYTES NFR BLD AUTO: 11 %
NEUTROPHILS # BLD AUTO: 4.3 X10E3/UL (ref 1.4–7)
NEUTROPHILS NFR BLD AUTO: 53 %
PLATELET # BLD AUTO: 222 X10E3/UL (ref 150–450)
POTASSIUM SERPL-SCNC: ABNORMAL MMOL/L
PROT SERPL-MCNC: 7.6 G/DL (ref 6–8.5)
PSA SERPL-MCNC: 0.9 NG/ML (ref 0–4)
RBC # BLD AUTO: 4.84 X10E6/UL (ref 4.14–5.8)
SODIUM SERPL-SCNC: 141 MMOL/L (ref 134–144)
TRIGL SERPL-MCNC: 211 MG/DL (ref 0–149)
VLDLC SERPL CALC-MCNC: 35 MG/DL (ref 5–40)
WBC # BLD AUTO: 8 X10E3/UL (ref 3.4–10.8)

## 2024-05-02 ENCOUNTER — LAB (OUTPATIENT)
Dept: FAMILY MEDICINE CLINIC | Facility: CLINIC | Age: 59
End: 2024-05-02
Payer: COMMERCIAL

## 2024-05-02 DIAGNOSIS — I10 HYPERTENSION, UNSPECIFIED TYPE: ICD-10-CM

## 2024-05-02 DIAGNOSIS — E83.52 HYPERCALCEMIA: ICD-10-CM

## 2024-05-02 DIAGNOSIS — I10 HYPERTENSION, UNSPECIFIED TYPE: Primary | ICD-10-CM

## 2024-05-03 LAB
ALBUMIN SERPL-MCNC: 4.9 G/DL (ref 3.8–4.9)
ALBUMIN/GLOB SERPL: 2 {RATIO} (ref 1.2–2.2)
ALP SERPL-CCNC: 104 IU/L (ref 44–121)
ALT SERPL-CCNC: 44 IU/L (ref 0–44)
AST SERPL-CCNC: 32 IU/L (ref 0–40)
BILIRUB SERPL-MCNC: 0.8 MG/DL (ref 0–1.2)
BUN SERPL-MCNC: 20 MG/DL (ref 6–24)
BUN/CREAT SERPL: 22 (ref 9–20)
CALCIUM SERPL-MCNC: 10.8 MG/DL (ref 8.7–10.2)
CHLORIDE SERPL-SCNC: 102 MMOL/L (ref 96–106)
CO2 SERPL-SCNC: 21 MMOL/L (ref 20–29)
CREAT SERPL-MCNC: 0.92 MG/DL (ref 0.76–1.27)
EGFRCR SERPLBLD CKD-EPI 2021: 96 ML/MIN/1.73
GLOBULIN SER CALC-MCNC: 2.4 G/DL (ref 1.5–4.5)
GLUCOSE SERPL-MCNC: 108 MG/DL (ref 70–99)
POTASSIUM SERPL-SCNC: 5 MMOL/L (ref 3.5–5.2)
PROT SERPL-MCNC: 7.3 G/DL (ref 6–8.5)
SODIUM SERPL-SCNC: 139 MMOL/L (ref 134–144)

## 2024-05-07 ENCOUNTER — TELEPHONE (OUTPATIENT)
Dept: FAMILY MEDICINE CLINIC | Facility: CLINIC | Age: 59
End: 2024-05-07
Payer: COMMERCIAL

## 2024-05-08 ENCOUNTER — TELEPHONE (OUTPATIENT)
Dept: FAMILY MEDICINE CLINIC | Facility: CLINIC | Age: 59
End: 2024-05-08
Payer: COMMERCIAL

## 2024-06-04 DIAGNOSIS — E78.2 MIXED HYPERLIPIDEMIA: ICD-10-CM

## 2024-06-04 DIAGNOSIS — I10 PRIMARY HYPERTENSION: ICD-10-CM

## 2024-06-04 RX ORDER — METOPROLOL SUCCINATE 50 MG/1
50 TABLET, EXTENDED RELEASE ORAL DAILY
Qty: 90 TABLET | Refills: 0 | Status: SHIPPED | OUTPATIENT
Start: 2024-06-04

## 2024-06-04 RX ORDER — EZETIMIBE 10 MG/1
10 TABLET ORAL DAILY
Qty: 90 TABLET | Refills: 0 | Status: SHIPPED | OUTPATIENT
Start: 2024-06-04

## 2024-06-04 RX ORDER — AMLODIPINE AND VALSARTAN 5; 320 MG/1; MG/1
1 TABLET ORAL DAILY
Qty: 90 TABLET | Refills: 0 | Status: SHIPPED | OUTPATIENT
Start: 2024-06-04

## 2024-06-04 RX ORDER — ROSUVASTATIN CALCIUM 10 MG/1
10 TABLET, COATED ORAL DAILY
Qty: 90 TABLET | Refills: 0 | Status: SHIPPED | OUTPATIENT
Start: 2024-06-04

## 2024-07-02 DIAGNOSIS — I10 PRIMARY HYPERTENSION: ICD-10-CM

## 2024-07-02 RX ORDER — AMLODIPINE AND VALSARTAN 5; 320 MG/1; MG/1
1 TABLET ORAL DAILY
Qty: 90 TABLET | Refills: 0 | Status: SHIPPED | OUTPATIENT
Start: 2024-07-02

## 2024-07-23 ENCOUNTER — LAB (OUTPATIENT)
Dept: FAMILY MEDICINE CLINIC | Facility: CLINIC | Age: 59
End: 2024-07-23
Payer: COMMERCIAL

## 2024-07-23 DIAGNOSIS — E83.52 HYPERCALCEMIA: ICD-10-CM

## 2024-07-23 PROCEDURE — 36415 COLL VENOUS BLD VENIPUNCTURE: CPT | Performed by: FAMILY MEDICINE

## 2024-07-24 LAB
CA-I SERPL ISE-MCNC: 5.3 MG/DL (ref 4.5–5.6)
PTH-INTACT SERPL-MCNC: 18 PG/ML (ref 15–65)

## 2024-08-30 DIAGNOSIS — E78.2 MIXED HYPERLIPIDEMIA: ICD-10-CM

## 2024-08-30 DIAGNOSIS — I10 PRIMARY HYPERTENSION: ICD-10-CM

## 2024-08-30 RX ORDER — ROSUVASTATIN CALCIUM 10 MG/1
10 TABLET, COATED ORAL DAILY
Qty: 90 TABLET | Refills: 3 | Status: SHIPPED | OUTPATIENT
Start: 2024-08-30

## 2024-08-30 RX ORDER — METOPROLOL SUCCINATE 50 MG/1
50 TABLET, EXTENDED RELEASE ORAL DAILY
Qty: 90 TABLET | Refills: 3 | Status: SHIPPED | OUTPATIENT
Start: 2024-08-30

## 2024-08-30 RX ORDER — EZETIMIBE 10 MG/1
10 TABLET ORAL DAILY
Qty: 90 TABLET | Refills: 3 | Status: SHIPPED | OUTPATIENT
Start: 2024-08-30

## 2024-10-25 ENCOUNTER — OFFICE VISIT (OUTPATIENT)
Dept: FAMILY MEDICINE CLINIC | Facility: CLINIC | Age: 59
End: 2024-10-25
Payer: COMMERCIAL

## 2024-10-25 VITALS
WEIGHT: 283 LBS | HEART RATE: 82 BPM | BODY MASS INDEX: 40.52 KG/M2 | SYSTOLIC BLOOD PRESSURE: 118 MMHG | HEIGHT: 70 IN | DIASTOLIC BLOOD PRESSURE: 78 MMHG | OXYGEN SATURATION: 98 %

## 2024-10-25 DIAGNOSIS — E83.52 HYPERCALCEMIA: ICD-10-CM

## 2024-10-25 DIAGNOSIS — Z13.29 THYROID DISORDER SCREEN: ICD-10-CM

## 2024-10-25 DIAGNOSIS — E78.2 MIXED HYPERLIPIDEMIA: Primary | ICD-10-CM

## 2024-10-25 DIAGNOSIS — I10 PRIMARY HYPERTENSION: ICD-10-CM

## 2024-10-25 DIAGNOSIS — Z13.1 DIABETES MELLITUS SCREENING: ICD-10-CM

## 2024-10-25 PROCEDURE — 99214 OFFICE O/P EST MOD 30 MIN: CPT | Performed by: FAMILY MEDICINE

## 2024-10-25 NOTE — PROGRESS NOTES
"     Follow Up Office Visit      Patient Name: Nelson Fong  : 1965   MRN: [id number]     Chief Complaint:    Chief Complaint   Patient presents with    Med Refill     Here for a follow up and med refill, states meds are working fine       History of Present Illness: Nelson Fong is a 59 y.o. male who is here today for follow up with hypertension and hyperlipidemia.  Patient would like to discuss previous lab work.  He denies any other new complaints.    Subjective      Review of Systems:   Review of Systems   Eyes:  Negative for blurred vision.   Respiratory:  Negative for shortness of breath.    Cardiovascular:  Negative for chest pain and palpitations.       The following portions of the patient's history were reviewed and updated as appropriate: allergies, current medications, past family history, past medical history, past social history, past surgical history and problem list.    Medications:     Current Outpatient Medications:     amLODIPine-valsartan (EXFORGE) 5-320 MG per tablet, Take 1 tablet by mouth Daily., Disp: 90 tablet, Rfl: 0    ezetimibe (ZETIA) 10 MG tablet, Take 1 tablet by mouth once daily, Disp: 90 tablet, Rfl: 3    metoprolol succinate XL (TOPROL-XL) 50 MG 24 hr tablet, Take 1 tablet by mouth once daily, Disp: 90 tablet, Rfl: 3    rosuvastatin (CRESTOR) 10 MG tablet, Take 1 tablet by mouth once daily, Disp: 90 tablet, Rfl: 3    Allergies:   No Known Allergies    Objective     Physical Exam:  Vital Signs:   Vitals:    10/25/24 0811   BP: 118/78   BP Location: Right arm   Patient Position: Sitting   Cuff Size: Large Adult   Pulse: 82   SpO2: 98%   Weight: 128 kg (283 lb)   Height: 177.8 cm (70\")     Body mass index is 40.61 kg/m².   Facility age limit for growth %amos is 20 years.    Physical Exam  Vitals and nursing note reviewed.   Constitutional:       Appearance: Normal appearance.   Cardiovascular:      Rate and Rhythm: Normal rate and regular rhythm.   Pulmonary:      Effort: " Pulmonary effort is normal.      Breath sounds: Normal breath sounds.   Neurological:      Mental Status: He is alert.         Procedures    PHQ-9 Total Score:      Assessment / Plan      Assessment/Plan:   Assessment & Plan  Mixed hyperlipidemia  Will check lipid profile today.  Primary hypertension  Good control.  Continue current regimen.  Diabetes mellitus screening  Patient has had elevated glucose in the past along with elevated triglycerides.  I suspect Insulin resistance/prediabetes versus diabetes.  Thyroid disorder screen    Hypercalcemia  Etiology not clear.  Will recheck CMP today.  Will continue to monitor.    Orders Placed This Encounter   Procedures    Hemoglobin A1c    CBC Auto Differential    Comprehensive Metabolic Panel    Lipid Panel    TSH                      Follow Up:   Return in about 6 months (around 4/25/2025).      KASSI Luna MD  [facility] [place]  Answers submitted by the patient for this visit:  Primary Reason for Visit (Submitted on 10/19/2024)  What is the primary reason for your visit?: High Blood Pressure  High Blood Pressure Questionnaire (Submitted on 10/19/2024)  Chief Complaint: Hypertension  Chronicity: chronic  Onset: more than 1 year ago  Progression since onset: stable  anxiety: No  headaches: No  malaise/fatigue: No  orthopnea: No  peripheral edema: No  Agents associated with hypertension: no associated agents  Compliance problems: diet

## 2024-10-28 ENCOUNTER — LAB (OUTPATIENT)
Dept: FAMILY MEDICINE CLINIC | Facility: CLINIC | Age: 59
End: 2024-10-28
Payer: COMMERCIAL

## 2024-10-28 DIAGNOSIS — I10 PRIMARY HYPERTENSION: ICD-10-CM

## 2024-10-28 DIAGNOSIS — Z13.1 DIABETES MELLITUS SCREENING: ICD-10-CM

## 2024-10-28 DIAGNOSIS — Z13.29 THYROID DISORDER SCREEN: ICD-10-CM

## 2024-10-28 DIAGNOSIS — E78.2 MIXED HYPERLIPIDEMIA: ICD-10-CM

## 2024-10-29 LAB
ALBUMIN SERPL-MCNC: 5 G/DL (ref 3.8–4.9)
ALP SERPL-CCNC: 110 IU/L (ref 44–121)
ALT SERPL-CCNC: 43 IU/L (ref 0–44)
AST SERPL-CCNC: 34 IU/L (ref 0–40)
BASOPHILS # BLD AUTO: 0.1 X10E3/UL (ref 0–0.2)
BASOPHILS NFR BLD AUTO: 1 %
BILIRUB SERPL-MCNC: 0.7 MG/DL (ref 0–1.2)
BUN SERPL-MCNC: 14 MG/DL (ref 6–24)
BUN/CREAT SERPL: 18 (ref 9–20)
CALCIUM SERPL-MCNC: 10.1 MG/DL (ref 8.7–10.2)
CHLORIDE SERPL-SCNC: 100 MMOL/L (ref 96–106)
CHOLEST SERPL-MCNC: 162 MG/DL (ref 100–199)
CO2 SERPL-SCNC: 20 MMOL/L (ref 20–29)
CREAT SERPL-MCNC: 0.8 MG/DL (ref 0.76–1.27)
EGFRCR SERPLBLD CKD-EPI 2021: 102 ML/MIN/1.73
EOSINOPHIL # BLD AUTO: 0.3 X10E3/UL (ref 0–0.4)
EOSINOPHIL NFR BLD AUTO: 3 %
ERYTHROCYTE [DISTWIDTH] IN BLOOD BY AUTOMATED COUNT: 13.4 % (ref 11.6–15.4)
GLOBULIN SER CALC-MCNC: 2.6 G/DL (ref 1.5–4.5)
GLUCOSE SERPL-MCNC: 88 MG/DL (ref 70–99)
HBA1C MFR BLD: 6.5 % (ref 4.8–5.6)
HCT VFR BLD AUTO: 48.9 % (ref 37.5–51)
HDLC SERPL-MCNC: 54 MG/DL
HGB BLD-MCNC: 16.5 G/DL (ref 13–17.7)
IMM GRANULOCYTES # BLD AUTO: 0 X10E3/UL (ref 0–0.1)
IMM GRANULOCYTES NFR BLD AUTO: 0 %
LDLC SERPL CALC-MCNC: 68 MG/DL (ref 0–99)
LYMPHOCYTES # BLD AUTO: 3.4 X10E3/UL (ref 0.7–3.1)
LYMPHOCYTES NFR BLD AUTO: 38 %
MCH RBC QN AUTO: 31.7 PG (ref 26.6–33)
MCHC RBC AUTO-ENTMCNC: 33.7 G/DL (ref 31.5–35.7)
MCV RBC AUTO: 94 FL (ref 79–97)
MONOCYTES # BLD AUTO: 1 X10E3/UL (ref 0.1–0.9)
MONOCYTES NFR BLD AUTO: 11 %
NEUTROPHILS # BLD AUTO: 4.1 X10E3/UL (ref 1.4–7)
NEUTROPHILS NFR BLD AUTO: 47 %
PLATELET # BLD AUTO: 234 X10E3/UL (ref 150–450)
POTASSIUM SERPL-SCNC: 4.2 MMOL/L (ref 3.5–5.2)
PROT SERPL-MCNC: 7.6 G/DL (ref 6–8.5)
RBC # BLD AUTO: 5.21 X10E6/UL (ref 4.14–5.8)
SODIUM SERPL-SCNC: 139 MMOL/L (ref 134–144)
TRIGL SERPL-MCNC: 251 MG/DL (ref 0–149)
TSH SERPL DL<=0.005 MIU/L-ACNC: 2.07 UIU/ML (ref 0.45–4.5)
VLDLC SERPL CALC-MCNC: 40 MG/DL (ref 5–40)
WBC # BLD AUTO: 8.8 X10E3/UL (ref 3.4–10.8)

## 2024-11-05 ENCOUNTER — TELEPHONE (OUTPATIENT)
Dept: FAMILY MEDICINE CLINIC | Facility: CLINIC | Age: 59
End: 2024-11-05

## 2024-11-05 ENCOUNTER — OFFICE VISIT (OUTPATIENT)
Dept: FAMILY MEDICINE CLINIC | Facility: CLINIC | Age: 59
End: 2024-11-05
Payer: COMMERCIAL

## 2024-11-05 VITALS
DIASTOLIC BLOOD PRESSURE: 80 MMHG | TEMPERATURE: 98 F | RESPIRATION RATE: 18 BRPM | HEART RATE: 74 BPM | SYSTOLIC BLOOD PRESSURE: 126 MMHG | WEIGHT: 282.5 LBS | HEIGHT: 70 IN | BODY MASS INDEX: 40.44 KG/M2 | OXYGEN SATURATION: 99 %

## 2024-11-05 DIAGNOSIS — I10 PRIMARY HYPERTENSION: ICD-10-CM

## 2024-11-05 DIAGNOSIS — E78.2 MIXED HYPERLIPIDEMIA: ICD-10-CM

## 2024-11-05 DIAGNOSIS — E11.9 TYPE 2 DIABETES MELLITUS WITHOUT COMPLICATION, WITHOUT LONG-TERM CURRENT USE OF INSULIN: Primary | ICD-10-CM

## 2024-11-05 PROCEDURE — 99214 OFFICE O/P EST MOD 30 MIN: CPT | Performed by: FAMILY MEDICINE

## 2024-11-05 RX ORDER — BLOOD-GLUCOSE METER
1 KIT MISCELLANEOUS AS NEEDED
Qty: 90 EACH | Refills: 11 | Status: SHIPPED | OUTPATIENT
Start: 2024-11-05

## 2024-11-05 RX ORDER — CHLORAL HYDRATE 500 MG
CAPSULE ORAL
COMMUNITY

## 2024-11-05 RX ORDER — ASPIRIN 81 MG/1
1 TABLET, CHEWABLE ORAL DAILY
COMMUNITY

## 2024-11-05 RX ORDER — DIPHENOXYLATE HYDROCHLORIDE AND ATROPINE SULFATE 2.5; .025 MG/1; MG/1
1 TABLET ORAL DAILY
COMMUNITY

## 2024-11-05 NOTE — PROGRESS NOTES
Follow Up Office Visit      Patient Name: Nelson Fong  : 1965   MRN: [id number]     Chief Complaint:    Chief Complaint   Patient presents with    Diabetes     Follow up        History of Present Illness: Nelson Fong is a 59 y.o. male who is here today for follow up with lab work and new diagnosis of diabetes.  Patient has questions regarding diet and glucose checks.    Subjective      Review of Systems:   Review of Systems   All other systems reviewed and are negative.      The following portions of the patient's history were reviewed and updated as appropriate: allergies, current medications, past family history, past medical history, past social history, past surgical history and problem list.    Medications:     Current Outpatient Medications:     amLODIPine-valsartan (EXFORGE) 5-320 MG per tablet, Take 1 tablet by mouth Daily., Disp: 90 tablet, Rfl: 0    aspirin 81 MG chewable tablet, Chew 1 tablet Daily., Disp: , Rfl:     ezetimibe (ZETIA) 10 MG tablet, Take 1 tablet by mouth once daily, Disp: 90 tablet, Rfl: 3    metoprolol succinate XL (TOPROL-XL) 50 MG 24 hr tablet, Take 1 tablet by mouth once daily, Disp: 90 tablet, Rfl: 3    multivitamin (MULTI VITAMIN DAILY PO), Take 1 tablet by mouth Daily., Disp: , Rfl:     multivitamin with minerals (VISION VITAMINS PO), Take  by mouth., Disp: , Rfl:     Omega-3 Fatty Acids (fish oil) 1000 MG capsule capsule, Take  by mouth Daily With Breakfast. OTC, Disp: , Rfl:     rosuvastatin (CRESTOR) 10 MG tablet, Take 1 tablet by mouth once daily, Disp: 90 tablet, Rfl: 3    Blood Glucose Monitoring Suppl Supplies misc, Test 2-3 times per day. 90 test strips and lancets., Disp: 90 each, Rfl: 3    glucose monitor monitoring kit, Use 1 each As Needed (Test 2-3 times per day)., Disp: 90 each, Rfl: 11    Tirzepatide 2.5 MG/0.5ML solution auto-injector, Inject 2.5 mg under the skin into the appropriate area as directed 1 (One) Time Per Week., Disp: 2 mL, Rfl:  "0    Allergies:   No Known Allergies    Objective     Physical Exam:  Vital Signs:   Vitals:    11/05/24 1108   BP: 126/80   BP Location: Left arm   Patient Position: Sitting   Cuff Size: Adult   Pulse: 74   Resp: 18   Temp: 98 °F (36.7 °C)   TempSrc: Temporal   SpO2: 99%   Weight: 128 kg (282 lb 8 oz)   Height: 177.8 cm (70\")   PainSc: 0-No pain     Body mass index is 40.53 kg/m².   Facility age limit for growth %amos is 20 years.    Physical Exam  Vitals and nursing note reviewed.   Constitutional:       Appearance: Normal appearance.   Cardiovascular:      Rate and Rhythm: Normal rate.   Pulmonary:      Effort: Pulmonary effort is normal.   Neurological:      Mental Status: He is alert. Mental status is at baseline.   Psychiatric:         Mood and Affect: Mood normal.         Thought Content: Thought content normal.         Judgment: Judgment normal.         Procedures    PHQ-9 Total Score:      Assessment / Plan      Assessment/Plan:   Assessment & Plan  Type 2 diabetes mellitus without complication, without long-term current use of insulin  A1c is 6.5.  Discussed treatment options.  Patient would like to lose some weight.  Will prescribe Mounjaro.  I discussed injection weekly as well as titration method.  Patient will get in touch with me in 1 month to increase dose if he is tolerating the current dose well.  Will prescribe glucose monitor.  I recommend testing after main meal a day.    Orders:    Tirzepatide 2.5 MG/0.5ML solution auto-injector; Inject 2.5 mg under the skin into the appropriate area as directed 1 (One) Time Per Week.    Blood Glucose Monitoring Suppl Supplies misc; Test 2-3 times per day. 90 test strips and lancets.    glucose monitor monitoring kit; Use 1 each As Needed (Test 2-3 times per day).    Primary hypertension  Stable on current regimen.         Mixed hyperlipidemia  Labs reviewed.  Stable.                       Follow Up:   Return in about 3 months (around 2/5/2025) for " Rodger.      KASSI Luna MD  [facility]

## 2024-11-25 DIAGNOSIS — I10 PRIMARY HYPERTENSION: ICD-10-CM

## 2024-11-25 RX ORDER — AMLODIPINE AND VALSARTAN 5; 320 MG/1; MG/1
1 TABLET ORAL DAILY
Qty: 90 TABLET | Refills: 0 | Status: SHIPPED | OUTPATIENT
Start: 2024-11-25

## 2024-11-30 DIAGNOSIS — E11.9 TYPE 2 DIABETES MELLITUS WITHOUT COMPLICATION, WITHOUT LONG-TERM CURRENT USE OF INSULIN: ICD-10-CM

## 2024-12-02 ENCOUNTER — TELEPHONE (OUTPATIENT)
Dept: FAMILY MEDICINE CLINIC | Facility: CLINIC | Age: 59
End: 2024-12-02
Payer: COMMERCIAL

## 2024-12-02 DIAGNOSIS — E11.9 TYPE 2 DIABETES MELLITUS WITHOUT COMPLICATION, WITHOUT LONG-TERM CURRENT USE OF INSULIN: Primary | ICD-10-CM

## 2024-12-02 RX ORDER — TIRZEPATIDE 2.5 MG/.5ML
2.5 INJECTION, SOLUTION SUBCUTANEOUS WEEKLY
Qty: 4 ML | Refills: 0 | Status: SHIPPED | OUTPATIENT
Start: 2024-12-02 | End: 2024-12-02

## 2024-12-02 NOTE — TELEPHONE ENCOUNTER
Caller: Nelson Fong    Relationship: Self    Best call back number: [phone]    Requested Prescriptions:   Requested Prescriptions      No prescriptions requested or ordered in this encounter    Mounjaro 2.5 MG/0.5ML solution auto-injector     Pharmacy where request should be sent: [facility] [address] [place] [address] [facility] [phone] [facility] [phone] FX     Last office visit with prescribing clinician: 11/5/2024   Last telemedicine visit with prescribing clinician: Visit date not found   Next office visit with prescribing clinician: 2/5/2025     Additional details provided by patient: PT NEEDS NEXT DOSAGE CALLED IN  HIS NEXT SHOT IS ON SAT 12/7/24    Does the patient have less than a 3 day supply:  [x] Yes  [] No    Would you like a call back once the refill request has been completed: [x] Yes [] No    If the office needs to give you a call back, can they leave a voicemail: [x] Yes [] No    Leah Beal Rep   12/02/24 12:57 EST

## 2024-12-27 DIAGNOSIS — E11.9 TYPE 2 DIABETES MELLITUS WITHOUT COMPLICATION, WITHOUT LONG-TERM CURRENT USE OF INSULIN: ICD-10-CM

## 2024-12-27 NOTE — TELEPHONE ENCOUNTER
Caller: Nelson Fong    Relationship: Self    Best call back number: [phone]     Requested Prescriptions:   Requested Prescriptions     Pending Prescriptions Disp Refills    Tirzepatide 5 MG/0.5ML solution auto-injector 2 mL 0     Sig: Inject 5 mg under the skin into the appropriate area as directed 1 (One) Time Per Week.        Pharmacy where request should be sent: [address] [phone] [facility] [phone]      Last office visit with prescribing clinician: 11/5/2024   Last telemedicine visit with prescribing clinician: Visit date not found   Next office visit with prescribing clinician: 2/5/2025     Additional details provided by patient: HAS ONE LEFT    Does the patient have less than a 3 day supply:  [x] Yes  [] No    Would you like a call back once the refill request has been completed: [x] Yes [] No    If the office needs to give you a call back, can they leave a voicemail: [x] Yes [] No    ADDITIONAL NOTES:  PATIENT STATES DOCTOR WANTED HIM TO INCREASED MONTHLY.  PER PATIENT, HE IS NOW CURRENTLY DUE FOR AN INCREASE.    Cheyanne Calle, Leah Rep   12/27/24 11:01 EST

## 2024-12-30 ENCOUNTER — TELEPHONE (OUTPATIENT)
Dept: FAMILY MEDICINE CLINIC | Facility: CLINIC | Age: 59
End: 2024-12-30
Payer: COMMERCIAL

## 2024-12-30 DIAGNOSIS — E11.9 TYPE 2 DIABETES MELLITUS WITHOUT COMPLICATION, WITHOUT LONG-TERM CURRENT USE OF INSULIN: Primary | ICD-10-CM

## 2024-12-30 NOTE — TELEPHONE ENCOUNTER
Caller: Nelson Fong    Relationship: Self    Best call back number: [phone]    Which medication are you concerned about:     Tirzepatide 5 MG/0.5ML solution auto-injector     What are your concerns:     PATIENT WAS UNDER THE IMPRESSION HE WOULD BE GETTING AN INCREASE TO THE 7.5 MG OF MOUNJARO  THE 5MG WAS CALLED IN    PLEASE CALL TO ADVISE    [facility] [address] [place] [address] [phone] [facility] [phone]

## 2025-01-14 ENCOUNTER — TELEPHONE (OUTPATIENT)
Dept: FAMILY MEDICINE CLINIC | Facility: CLINIC | Age: 60
End: 2025-01-14
Payer: COMMERCIAL

## 2025-01-14 DIAGNOSIS — G47.33 OBSTRUCTIVE SLEEP APNEA SYNDROME: Primary | ICD-10-CM

## 2025-01-14 NOTE — TELEPHONE ENCOUNTER
Caller: Nelson Fong    Relationship: Self    Best call back number: [phone]     What medication are you requesting: CPAP SUPPLIES    If a prescription is needed, what is your preferred pharmacy and phone number: [facility] - [place], [place] - [address]  - [phone] [facility] [phone] FX     Additional notes: PATIENT'S PRESCRIPTIONS FOR CPAP SUPPLIES HAS RUN OUT. PLEASE SEND A NEW ONE.

## 2025-01-27 DIAGNOSIS — E11.9 TYPE 2 DIABETES MELLITUS WITHOUT COMPLICATION, WITHOUT LONG-TERM CURRENT USE OF INSULIN: ICD-10-CM

## 2025-01-27 NOTE — TELEPHONE ENCOUNTER
Caller: Nelson Fong    Relationship: Self    Best call back number: [phone]     Requested Prescriptions:   Requested Prescriptions     Pending Prescriptions Disp Refills    Tirzepatide 7.5 MG/0.5ML solution auto-injector 2 mL 1     Sig: Inject 7.5 mg under the skin into the appropriate area as directed 1 (One) Time Per Week.        Pharmacy where request should be sent: [address] [phone] [facility] [phone] FX     Last office visit with prescribing clinician: 11/5/2024   Last telemedicine visit with prescribing clinician: Visit date not found   Next office visit with prescribing clinician: 2/5/2025     Additional details provided by patient: PATIENT IS REQUESTING TO GO UP TO THE NEXT DOSAGE LEVEL.     Does the patient have less than a 3 day supply:  [] Yes  [x] No    Would you like a call back once the refill request has been completed: [] Yes [x] No    If the office needs to give you a call back, can they leave a voicemail: [] Yes [x] No    Leah Moya Rep   01/27/25 10:06 EST

## 2025-01-28 ENCOUNTER — TELEPHONE (OUTPATIENT)
Dept: FAMILY MEDICINE CLINIC | Facility: CLINIC | Age: 60
End: 2025-01-28
Payer: COMMERCIAL

## 2025-01-28 NOTE — TELEPHONE ENCOUNTER
Hetal called today in regards to the Tirzepatide.  He stated that he wanted to go up to the 10 mg, but the 7.5 mg was sent to the pharmacy.  Please advise.

## 2025-02-05 ENCOUNTER — OFFICE VISIT (OUTPATIENT)
Dept: FAMILY MEDICINE CLINIC | Facility: CLINIC | Age: 60
End: 2025-02-05
Payer: COMMERCIAL

## 2025-02-05 VITALS
SYSTOLIC BLOOD PRESSURE: 142 MMHG | HEIGHT: 70 IN | DIASTOLIC BLOOD PRESSURE: 86 MMHG | HEART RATE: 82 BPM | WEIGHT: 280 LBS | RESPIRATION RATE: 18 BRPM | BODY MASS INDEX: 40.09 KG/M2 | OXYGEN SATURATION: 98 %

## 2025-02-05 DIAGNOSIS — E78.2 MIXED HYPERLIPIDEMIA: ICD-10-CM

## 2025-02-05 DIAGNOSIS — E11.9 TYPE 2 DIABETES MELLITUS WITHOUT COMPLICATION, WITHOUT LONG-TERM CURRENT USE OF INSULIN: Primary | ICD-10-CM

## 2025-02-05 DIAGNOSIS — I10 PRIMARY HYPERTENSION: ICD-10-CM

## 2025-02-05 LAB
EXPIRATION DATE: NORMAL
HBA1C MFR BLD: 5.4 % (ref 4.5–5.7)
Lab: NORMAL

## 2025-02-05 PROCEDURE — 99214 OFFICE O/P EST MOD 30 MIN: CPT | Performed by: FAMILY MEDICINE

## 2025-02-05 PROCEDURE — 83036 HEMOGLOBIN GLYCOSYLATED A1C: CPT | Performed by: FAMILY MEDICINE

## 2025-02-05 RX ORDER — LANCETS
EACH MISCELLANEOUS
COMMUNITY
Start: 2025-01-24

## 2025-02-05 NOTE — ASSESSMENT & PLAN NOTE
A1c is much improved.  We discussed titration of Mounjaro.  At this time patient plans to stay on the 10 mg.  He will contact me if he would like to increase the dose at a later date.    Orders:    POC Glycosylated Hemoglobin (Hb A1C)    Microalbumin / Creatinine Urine Ratio - Urine, Clean Catch; Future    Microalbumin / Creatinine Urine Ratio - Urine, Clean Catch    
Borderline control today.  Patient states BP is much better at home.  Will plan to continue current management.         
Labs reviewed from October 2024 and discussed with patient.  Will continue rosuvastatin 10 mg and plan to recheck next visit.  Will see back in 6 months.         
radiology results/need for outpatient follow-up/return to ED if symptoms worsen, persist or questions arise

## 2025-02-05 NOTE — PROGRESS NOTES
Follow Up Office Visit      Patient Name: Nelson Fong  : 1965   MRN: [id number]     Chief Complaint:    Chief Complaint   Patient presents with    Diabetes       History of Present Illness: Nelson Fong is a 59 y.o. male who is here today for follow up with diabetes, hypertension, hyperlipidemia.  Patient has just started the 10 mg Mounjaro.  He has had the first dose.  He did have some belching shortly after the dose but otherwise is tolerating fine.  He states this is the first dose he is felt early satiety.  He denies any other complaints today.  He has questions regarding Mounjaro titration.    Subjective      Review of Systems:   Review of Systems   Constitutional:  Negative for chills, diaphoresis, fatigue and fever.   HENT:  Negative for congestion, sore throat and swollen glands.    Respiratory:  Negative for cough.    Cardiovascular:  Negative for chest pain.   Gastrointestinal:  Negative for abdominal pain, nausea and vomiting.   Genitourinary:  Negative for dysuria.   Musculoskeletal:  Negative for myalgias and neck pain.   Skin:  Negative for rash.   Neurological:  Negative for weakness and numbness.       The following portions of the patient's history were reviewed and updated as appropriate: allergies, current medications, past family history, past medical history, past social history, past surgical history and problem list.    Medications:     Current Outpatient Medications:     Accu-Chek Softclix Lancets lancets, TEST 2-3 TIMES PER DAY, Disp: , Rfl:     amLODIPine-valsartan (EXFORGE) 5-320 MG per tablet, Take 1 tablet by mouth once daily, Disp: 90 tablet, Rfl: 0    aspirin 81 MG chewable tablet, Chew 1 tablet Daily., Disp: , Rfl:     Blood Glucose Monitoring Suppl Supplies misc, Test 2-3 times per day. 90 test strips and lancets., Disp: 90 each, Rfl: 3    ezetimibe (ZETIA) 10 MG tablet, Take 1 tablet by mouth once daily, Disp: 90 tablet, Rfl: 3    glucose blood test strip, TEST 2-3  "TIMES PER DAY., Disp: , Rfl:     glucose monitor monitoring kit, Use 1 each As Needed (Test 2-3 times per day)., Disp: 90 each, Rfl: 11    metoprolol succinate XL (TOPROL-XL) 50 MG 24 hr tablet, Take 1 tablet by mouth once daily, Disp: 90 tablet, Rfl: 3    multivitamin (MULTI VITAMIN DAILY PO), Take 1 tablet by mouth Daily., Disp: , Rfl:     multivitamin with minerals (VISION VITAMINS PO), Take  by mouth., Disp: , Rfl:     Omega-3 Fatty Acids (fish oil) 1000 MG capsule capsule, Take  by mouth Daily With Breakfast. OTC, Disp: , Rfl:     rosuvastatin (CRESTOR) 10 MG tablet, Take 1 tablet by mouth once daily, Disp: 90 tablet, Rfl: 3    Tirzepatide 10 MG/0.5ML solution auto-injector, Inject 10 mg under the skin into the appropriate area as directed 1 (One) Time Per Week., Disp: 2 mL, Rfl: 0    Allergies:   No Known Allergies    Objective     Physical Exam:  Vital Signs:   Vitals:    02/05/25 1009   BP: 142/86   BP Location: Left arm   Patient Position: Sitting   Cuff Size: Adult   Pulse: 82   Resp: 18   SpO2: 98%   Weight: 127 kg (280 lb)   Height: 177.8 cm (70\")   PainSc: 0-No pain     Body mass index is 40.18 kg/m².   Facility age limit for growth %amos is 20 years.    Physical Exam  Vitals and nursing note reviewed.   Constitutional:       Appearance: He is obese.   Cardiovascular:      Rate and Rhythm: Normal rate and regular rhythm.   Pulmonary:      Effort: Pulmonary effort is normal.      Breath sounds: Normal breath sounds.   Neurological:      Mental Status: He is alert.         Procedures    PHQ-9 Total Score:      Assessment / Plan      Assessment/Plan:   Assessment & Plan  Type 2 diabetes mellitus without complication, without long-term current use of insulin  A1c is much improved.  We discussed titration of Mounjaro.  At this time patient plans to stay on the 10 mg.  He will contact me if he would like to increase the dose at a later date.    Orders:    POC Glycosylated Hemoglobin (Hb A1C)    Microalbumin / " Creatinine Urine Ratio - Urine, Clean Catch; Future    Microalbumin / Creatinine Urine Ratio - Urine, Clean Catch    Primary hypertension  Borderline control today.  Patient states BP is much better at home.  Will plan to continue current management.         Mixed hyperlipidemia  Labs reviewed from October 2024 and discussed with patient.  Will continue rosuvastatin 10 mg and plan to recheck next visit.  Will see back in 6 months.                       Follow Up:   Return in about 6 months (around 8/5/2025).      KASSI Luna MD  [facility]

## 2025-02-06 LAB
ALBUMIN/CREAT UR: <17 MG/G CREAT (ref 0–29)
CREAT UR-MCNC: 17.8 MG/DL
MICROALBUMIN UR-MCNC: <3 UG/ML

## 2025-02-20 DIAGNOSIS — I10 PRIMARY HYPERTENSION: ICD-10-CM

## 2025-02-20 RX ORDER — AMLODIPINE AND VALSARTAN 5; 320 MG/1; MG/1
1 TABLET ORAL DAILY
Qty: 90 TABLET | Refills: 0 | Status: SHIPPED | OUTPATIENT
Start: 2025-02-20

## 2025-02-24 DIAGNOSIS — E11.9 TYPE 2 DIABETES MELLITUS WITHOUT COMPLICATION, WITHOUT LONG-TERM CURRENT USE OF INSULIN: ICD-10-CM

## 2025-03-25 ENCOUNTER — PATIENT MESSAGE (OUTPATIENT)
Dept: FAMILY MEDICINE CLINIC | Facility: CLINIC | Age: 60
End: 2025-03-25
Payer: COMMERCIAL

## 2025-03-25 DIAGNOSIS — E11.9 TYPE 2 DIABETES MELLITUS WITHOUT COMPLICATION, WITHOUT LONG-TERM CURRENT USE OF INSULIN: Primary | ICD-10-CM

## 2025-04-25 ENCOUNTER — OFFICE VISIT (OUTPATIENT)
Dept: FAMILY MEDICINE CLINIC | Facility: CLINIC | Age: 60
End: 2025-04-25
Payer: COMMERCIAL

## 2025-04-25 VITALS
BODY MASS INDEX: 40.47 KG/M2 | HEIGHT: 70 IN | DIASTOLIC BLOOD PRESSURE: 84 MMHG | SYSTOLIC BLOOD PRESSURE: 130 MMHG | OXYGEN SATURATION: 99 % | HEART RATE: 81 BPM | WEIGHT: 282.7 LBS

## 2025-04-25 DIAGNOSIS — E11.9 TYPE 2 DIABETES MELLITUS WITHOUT COMPLICATION, WITHOUT LONG-TERM CURRENT USE OF INSULIN: Primary | ICD-10-CM

## 2025-04-25 DIAGNOSIS — E78.2 MIXED HYPERLIPIDEMIA: ICD-10-CM

## 2025-04-25 DIAGNOSIS — I10 PRIMARY HYPERTENSION: ICD-10-CM

## 2025-04-25 DIAGNOSIS — Z12.5 PROSTATE CANCER SCREENING: ICD-10-CM

## 2025-04-25 LAB
EXPIRATION DATE: NORMAL
HBA1C MFR BLD: 5.5 % (ref 4.5–5.7)
Lab: NORMAL

## 2025-04-25 NOTE — PROGRESS NOTES
Follow Up Office Visit      Patient Name: Nelson Fong  : 1965   MRN: [id number]     Chief Complaint:    Chief Complaint   Patient presents with    Hypertension     Pt presents for a 6 month follow up. Med hx includes HTN, HLD, DM and sleep apnea. Updated med lit. Pt taking all meds as prescribed, tolerating well.    Diabetes     Pt presents for 6 month follow up. Currently taking Tirzepatide injections to manage DM. Pt states he is tolerating well but states he is not noticed much of a difference in terms of weight loss, hunger control; at home glucose monitoring on occasion       History of Present Illness: Nelson Fong is a 60 y.o. male who is here today for follow up with needs.  Patient denies any complaints.  He states that tirzepatide has not helped with weight management.  He denies any other complaints or concerns today.    Subjective      Review of Systems:   Review of Systems   Constitutional:  Negative for unexpected weight loss.   Eyes:  Negative for blurred vision.   Endocrine: Negative for polydipsia and polyuria.   Neurological:  Negative for tremors, speech difficulty and confusion.       The following portions of the patient's history were reviewed and updated as appropriate: allergies, current medications, past family history, past medical history, past social history, past surgical history and problem list.    Medications:     Current Outpatient Medications:     Accu-Chek Softclix Lancets lancets, TEST 2-3 TIMES PER DAY, Disp: , Rfl:     amLODIPine-valsartan (EXFORGE) 5-320 MG per tablet, Take 1 tablet by mouth once daily, Disp: 90 tablet, Rfl: 0    aspirin 81 MG chewable tablet, Chew 1 tablet Daily., Disp: , Rfl:     Blood Glucose Monitoring Suppl Supplies misc, Test 2-3 times per day. 90 test strips and lancets., Disp: 90 each, Rfl: 3    ezetimibe (ZETIA) 10 MG tablet, Take 1 tablet by mouth once daily, Disp: 90 tablet, Rfl: 3    glucose blood test strip, TEST 2-3 TIMES PER DAY.,  "Disp: , Rfl:     glucose monitor monitoring kit, Use 1 each As Needed (Test 2-3 times per day)., Disp: 90 each, Rfl: 11    metoprolol succinate XL (TOPROL-XL) 50 MG 24 hr tablet, Take 1 tablet by mouth once daily, Disp: 90 tablet, Rfl: 3    multivitamin (MULTI VITAMIN DAILY PO), Take 1 tablet by mouth Daily., Disp: , Rfl:     multivitamin with minerals (VISION VITAMINS PO), Take  by mouth., Disp: , Rfl:     Omega-3 Fatty Acids (fish oil) 1000 MG capsule capsule, Take  by mouth Daily With Breakfast. OTC, Disp: , Rfl:     rosuvastatin (CRESTOR) 10 MG tablet, Take 1 tablet by mouth once daily, Disp: 90 tablet, Rfl: 3    Tirzepatide 15 MG/0.5ML solution auto-injector, Inject 0.5 mL under the skin into the appropriate area as directed 1 (One) Time Per Week., Disp: 2 mL, Rfl: 6    Allergies:   No Known Allergies    Objective     Physical Exam:  Vital Signs:   Vitals:    04/25/25 0852   BP: 130/84   BP Location: Left arm   Patient Position: Sitting   Cuff Size: Large Adult   Pulse: 81   SpO2: 99%   Weight: 128 kg (282 lb 11.2 oz)   Height: 177.8 cm (70\")   PainSc: 0-No pain     Body mass index is 40.56 kg/m².   Facility age limit for growth %amos is 20 years.    Physical Exam  Vitals and nursing note reviewed.         Procedures    PHQ-9 Total Score:      Assessment / Plan      Assessment/Plan:   Assessment & Plan  Type 2 diabetes mellitus without complication, without long-term current use of insulin  Will check hemoglobin A1c today.  No evidence of proteinuria at last visit in February.  Patient overall states he is doing well.  Will place orders for patient to come back for lab check in about 3 to 4 months.    Orders:    POC Glycosylated Hemoglobin (Hb A1C); Future    Hemoglobin A1c; Future    CBC Auto Differential; Future    Comprehensive Metabolic Panel; Future    Lipid Panel; Future    PSA Screen; Future    Prostate cancer screening    Orders:    PSA Screen; Future    Primary hypertension  Stable.  Continue current " management.    Orders:    CBC Auto Differential; Future    Comprehensive Metabolic Panel; Future    Mixed hyperlipidemia       Orders:    Lipid Panel; Future                 Follow Up:   Return in about 6 months (around 10/25/2025).      KASSI Luna MD  [facility]  Answers submitted by the patient for this visit:  Diabetes Questionnaire (Submitted on 4/19/2025)  Chief Complaint: Diabetes problem  Diabetes type: type 2  MedicAlert ID: No  Disease duration: 6 Months  Treatment compliance: most of the time  Symptom course: stable  Home blood tests: 1-2 x per week  High score:   Below 70: never  foot paresthesias: No  foot ulcerations: No  headaches: No  sweats: No  Current diet: generally healthy  Meal planning: carbohydrate counting  Exercise: five times a week  Eye exam current: Yes  Sees podiatrist: No

## 2025-04-25 NOTE — ASSESSMENT & PLAN NOTE
Stable.  Continue current management.    Orders:    CBC Auto Differential; Future    Comprehensive Metabolic Panel; Future

## 2025-04-25 NOTE — ASSESSMENT & PLAN NOTE
Will check hemoglobin A1c today.  No evidence of proteinuria at last visit in February.  Patient overall states he is doing well.  Will place orders for patient to come back for lab check in about 3 to 4 months.    Orders:    POC Glycosylated Hemoglobin (Hb A1C); Future    Hemoglobin A1c; Future    CBC Auto Differential; Future    Comprehensive Metabolic Panel; Future    Lipid Panel; Future    PSA Screen; Future

## 2025-05-17 DIAGNOSIS — I10 PRIMARY HYPERTENSION: ICD-10-CM

## 2025-05-17 RX ORDER — AMLODIPINE AND VALSARTAN 5; 320 MG/1; MG/1
1 TABLET ORAL DAILY
Qty: 90 TABLET | Refills: 0 | Status: SHIPPED | OUTPATIENT
Start: 2025-05-17

## 2025-08-14 DIAGNOSIS — I10 PRIMARY HYPERTENSION: ICD-10-CM

## 2025-08-14 DIAGNOSIS — E78.2 MIXED HYPERLIPIDEMIA: ICD-10-CM

## 2025-08-14 RX ORDER — ROSUVASTATIN CALCIUM 10 MG/1
10 TABLET, COATED ORAL DAILY
Qty: 90 TABLET | Refills: 0 | Status: SHIPPED | OUTPATIENT
Start: 2025-08-14

## 2025-08-14 RX ORDER — AMLODIPINE AND VALSARTAN 5; 320 MG/1; MG/1
1 TABLET ORAL DAILY
Qty: 90 TABLET | Refills: 0 | Status: SHIPPED | OUTPATIENT
Start: 2025-08-14

## 2025-08-14 RX ORDER — METOPROLOL SUCCINATE 50 MG/1
50 TABLET, EXTENDED RELEASE ORAL DAILY
Qty: 90 TABLET | Refills: 0 | Status: SHIPPED | OUTPATIENT
Start: 2025-08-14

## 2025-08-14 RX ORDER — EZETIMIBE 10 MG/1
10 TABLET ORAL DAILY
Qty: 90 TABLET | Refills: 0 | Status: SHIPPED | OUTPATIENT
Start: 2025-08-14

## 2025-08-23 LAB
ALBUMIN SERPL-MCNC: 5.1 G/DL (ref 3.8–4.9)
ALP SERPL-CCNC: 100 IU/L (ref 44–121)
ALT SERPL-CCNC: 45 IU/L (ref 0–44)
AST SERPL-CCNC: 41 IU/L (ref 0–40)
BASOPHILS # BLD AUTO: 0 X10E3/UL (ref 0–0.2)
BASOPHILS NFR BLD AUTO: 0 %
BILIRUB SERPL-MCNC: 0.8 MG/DL (ref 0–1.2)
BUN SERPL-MCNC: 15 MG/DL (ref 8–27)
BUN/CREAT SERPL: 20 (ref 10–24)
CALCIUM SERPL-MCNC: 10.7 MG/DL (ref 8.6–10.2)
CHLORIDE SERPL-SCNC: 104 MMOL/L (ref 96–106)
CHOLEST SERPL-MCNC: 150 MG/DL (ref 100–199)
CO2 SERPL-SCNC: 16 MMOL/L (ref 20–29)
CREAT SERPL-MCNC: 0.76 MG/DL (ref 0.76–1.27)
EGFRCR SERPLBLD CKD-EPI 2021: 103 ML/MIN/1.73
EOSINOPHIL # BLD AUTO: 0.3 X10E3/UL (ref 0–0.4)
EOSINOPHIL NFR BLD AUTO: 4 %
ERYTHROCYTE [DISTWIDTH] IN BLOOD BY AUTOMATED COUNT: 13.7 % (ref 11.6–15.4)
GLOBULIN SER CALC-MCNC: 2.7 G/DL (ref 1.5–4.5)
GLUCOSE SERPL-MCNC: 84 MG/DL (ref 70–99)
HBA1C MFR BLD: 5.6 % (ref 4.8–5.6)
HCT VFR BLD AUTO: 47.9 % (ref 37.5–51)
HDLC SERPL-MCNC: 52 MG/DL
HGB BLD-MCNC: 16.1 G/DL (ref 13–17.7)
IMM GRANULOCYTES # BLD AUTO: 0 X10E3/UL (ref 0–0.1)
IMM GRANULOCYTES NFR BLD AUTO: 0 %
LDLC SERPL CALC-MCNC: 69 MG/DL (ref 0–99)
LYMPHOCYTES # BLD AUTO: 2.9 X10E3/UL (ref 0.7–3.1)
LYMPHOCYTES NFR BLD AUTO: 34 %
MCH RBC QN AUTO: 31.8 PG (ref 26.6–33)
MCHC RBC AUTO-ENTMCNC: 33.6 G/DL (ref 31.5–35.7)
MCV RBC AUTO: 95 FL (ref 79–97)
MONOCYTES # BLD AUTO: 0.8 X10E3/UL (ref 0.1–0.9)
MONOCYTES NFR BLD AUTO: 10 %
NEUTROPHILS # BLD AUTO: 4.5 X10E3/UL (ref 1.4–7)
NEUTROPHILS NFR BLD AUTO: 52 %
PLATELET # BLD AUTO: 175 X10E3/UL (ref 150–450)
POTASSIUM SERPL-SCNC: 4.4 MMOL/L (ref 3.5–5.2)
PROT SERPL-MCNC: 7.8 G/DL (ref 6–8.5)
PSA SERPL-MCNC: 0.9 NG/ML (ref 0–4)
RBC # BLD AUTO: 5.07 X10E6/UL (ref 4.14–5.8)
SODIUM SERPL-SCNC: 139 MMOL/L (ref 134–144)
TRIGL SERPL-MCNC: 170 MG/DL (ref 0–149)
VLDLC SERPL CALC-MCNC: 29 MG/DL (ref 5–40)
WBC # BLD AUTO: 8.6 X10E3/UL (ref 3.4–10.8)

## 2025-08-25 ENCOUNTER — RESULTS FOLLOW-UP (OUTPATIENT)
Dept: FAMILY MEDICINE CLINIC | Facility: CLINIC | Age: 60
End: 2025-08-25
Payer: COMMERCIAL